# Patient Record
Sex: FEMALE | Race: WHITE
[De-identification: names, ages, dates, MRNs, and addresses within clinical notes are randomized per-mention and may not be internally consistent; named-entity substitution may affect disease eponyms.]

---

## 2018-01-11 ENCOUNTER — HOSPITAL ENCOUNTER (INPATIENT)
Dept: HOSPITAL 74 - FER | Age: 83
LOS: 10 days | Discharge: HOME HEALTH | DRG: 299 | End: 2018-01-21
Attending: NURSE PRACTITIONER | Admitting: INTERNAL MEDICINE
Payer: COMMERCIAL

## 2018-01-11 VITALS — BODY MASS INDEX: 26.2 KG/M2

## 2018-01-11 DIAGNOSIS — I50.22: ICD-10-CM

## 2018-01-11 DIAGNOSIS — R64: ICD-10-CM

## 2018-01-11 DIAGNOSIS — F03.90: ICD-10-CM

## 2018-01-11 DIAGNOSIS — I82.890: ICD-10-CM

## 2018-01-11 DIAGNOSIS — I26.99: ICD-10-CM

## 2018-01-11 DIAGNOSIS — E86.1: ICD-10-CM

## 2018-01-11 DIAGNOSIS — J90: ICD-10-CM

## 2018-01-11 DIAGNOSIS — J06.9: ICD-10-CM

## 2018-01-11 DIAGNOSIS — I82.432: ICD-10-CM

## 2018-01-11 DIAGNOSIS — N17.9: ICD-10-CM

## 2018-01-11 DIAGNOSIS — N18.9: ICD-10-CM

## 2018-01-11 DIAGNOSIS — I51.3: ICD-10-CM

## 2018-01-11 DIAGNOSIS — E87.2: ICD-10-CM

## 2018-01-11 DIAGNOSIS — R33.9: ICD-10-CM

## 2018-01-11 DIAGNOSIS — I13.0: ICD-10-CM

## 2018-01-11 DIAGNOSIS — I25.10: ICD-10-CM

## 2018-01-11 DIAGNOSIS — R91.1: ICD-10-CM

## 2018-01-11 DIAGNOSIS — I82.411: Primary | ICD-10-CM

## 2018-01-11 DIAGNOSIS — I48.2: ICD-10-CM

## 2018-01-11 DIAGNOSIS — J98.11: ICD-10-CM

## 2018-01-11 LAB
ALBUMIN SERPL-MCNC: 3 G/DL (ref 3.5–5)
ALP SERPL-CCNC: 70 U/L (ref 32–92)
ALT SERPL-CCNC: 13 U/L (ref 10–40)
ANION GAP SERPL CALC-SCNC: 13 MMOL/L (ref 8–16)
AST SERPL-CCNC: 25 U/L (ref 10–42)
BASOPHILS # BLD: 0.6 % (ref 0–2)
BILIRUB SERPL-MCNC: 0.7 MG/DL (ref 0.2–1)
BUN SERPL-MCNC: 22 MG/DL (ref 7–18)
CALCIUM SERPL-MCNC: 9.1 MG/DL (ref 8.4–10.2)
CHLORIDE SERPL-SCNC: 98 MMOL/L (ref 98–107)
CO2 SERPL-SCNC: 22 MMOL/L (ref 22–28)
COLOR UR: YELLOW
CREAT SERPL-MCNC: 1.7 MG/DL (ref 0.6–1.3)
DEPRECATED RDW RBC AUTO: 13.6 % (ref 11.6–15.6)
EOSINOPHIL # BLD: 0.2 % (ref 0–4.5)
GLUCOSE SERPL-MCNC: 226 MG/DL (ref 74–106)
HCT VFR BLD CALC: 38.7 % (ref 32.4–45.2)
HGB BLD-MCNC: 12.3 GM/DL (ref 10.7–15.3)
INR BLD: 1.33 (ref 0.82–1.09)
LYMPHOCYTES # BLD: 17.3 % (ref 8–40)
MCH RBC QN AUTO: 28.3 PG (ref 25.7–33.7)
MCHC RBC AUTO-ENTMCNC: 31.8 G/DL (ref 32–36)
MCV RBC: 89.1 FL (ref 80–96)
MONOCYTES # BLD AUTO: 9.1 % (ref 3.8–10.2)
NEUTROPHILS # BLD: 72.8 % (ref 42.8–82.8)
PH UR: 6 [PH] (ref 4.5–8)
PLATELET # BLD AUTO: 287 K/MM3 (ref 134–434)
PMV BLD: 9.7 FL (ref 7.5–11.1)
POTASSIUM SERPLBLD-SCNC: 5.1 MMOL/L (ref 3.5–5.1)
PROT SERPL-MCNC: 6.4 G/DL (ref 6.4–8.3)
PT PNL PPP: 14.8 SEC (ref 10.2–13)
RBC # BLD AUTO: 4.35 M/MM3 (ref 3.6–5.2)
SODIUM SERPL-SCNC: 133 MMOL/L (ref 136–145)
SP GR UR: 1.01 (ref 1–1.02)
TROPONIN I SERPL-MCNC: 0.21 NG/ML (ref 0.03–0.5)
UROBILINOGEN UR STRIP-MCNC: 0.2 MG/DL (ref 0.2–1)
WBC # BLD AUTO: 15.1 K/MM3 (ref 4–10.8)

## 2018-01-11 PROCEDURE — G0378 HOSPITAL OBSERVATION PER HR: HCPCS

## 2018-01-11 RX ADMIN — SODIUM CHLORIDE SCH MLS/HR: 9 INJECTION, SOLUTION INTRAVENOUS at 23:18

## 2018-01-11 NOTE — PDOC
History of Present Illness





- General


History Source: Patient, Care Provider, Friend


Exam Limitations: No Limitations





- History of Present Illness


Initial Comments: 





01/11/18 16:19


The patient is an 85 year old female with past medical history of hypertension, 

Afib (currently not on blood thinners), venous stasis ulcers, and dementia who 

arrives to the ED from her PCPs office for low blood pressure today. As per aid

, the patient had increased ambulation than she's had in the past few days 

because of her doctors appointment today. The aid states that upon arrival to 

the doctors office the patients hands became very cold and clammy, and the 

patient had a brief episode of unresponsiveness. At that time her blood 

pressure was taken and noted to be low. She denies any LOC. In the ED, the 

patient seems to have returned to her baseline, but her blood pressure remains 

low. The aid also reports that the patient has been battling a cold for the 

past ten days, complaining of a dry cough that has been improving every day. 

They note that she had been not at her baseline while fighting the cold, but 

has been a lot better today prior to this incident. 





In mid December, the patient fell and injured her leg, causing formation of a 

hematoma on her right leg, as a result of her Xarelto, which was excised at [facility] 

three weeks ago. The patient has not been on blood thinners since, but was at 

her PCPs office today to see if she can return to taking them. The patient also 

had a wound check on Tuesday which has been healing well, and a new dressing 

was placed. She denies any sick contacts. She denies any fevers or  chills, 

nausea, vomiting, diarrhea, SOB, CP, or urinary complaints. 





PCP: Adis Arnold 








<Raisas Peña - Last Filed: 01/11/18 18:11>





<Vipin Larsen - Last Filed: 01/12/18 09:06>





- General


Chief Complaint: Lethargy


Stated Complaint: LETHARGY,WEAKNESS


Time Seen by Provider: 01/11/18 15:18





Past History





<Raissa Peña - Last Filed: 01/11/18 18:11>





- Suicide/Smoking/Psychosocial Hx


Smoking History: Never smoked


Hx Alcohol Use: No


Drug/Substance Use Hx: No


Substance Use Type: None





<Vipin Larsen - Last Filed: 01/12/18 09:06>





- Past Medical History


Allergies/Adverse Reactions: 


 Allergies











Allergy/AdvReac Type Severity Reaction Status Date / Time


 


iodine Allergy   Verified 01/11/18 15:06


 


Penicillins Allergy   Verified 01/11/18 15:06


 


donepezil AdvReac Intermediate abdominal Verified 01/11/18 15:06





   pain  











Home Medications: 


Ambulatory Orders





Cholecalciferol (Vitamin D3) [Vitamin D3] 2,000 unit PO DAILY  tablet 09/05/17 


Donepezil HCl [Aricept] 5 mg PO HS 01/11/18 


Furosemide [Lasix] 20 mg PO BID 01/11/18 


Potassium Chloride [K-Dur -] 20 meq PO BID 01/11/18 











**Review of Systems





- Review of Systems


Able to Perform ROS?: Yes


Comments:: 





01/11/18 16:21


CONSTITUTIONAL:


Present: weakness, unresponsiveness  


Absent: fever, chills, diaphoresis


HEENT: 


Absent: rhinorrhea, nasal congestion, throat pain, throat swelling, difficulty 

swallowing,


mouth swelling, ear pain, eye pain, visual Changes


CARDIOVASCULAR:


Present: low blood pressure 


Absent: chest pain, syncope, palpitations, irregular heart rate, lightheadedness

, peripheral


edema


RESPIRATORY: 


Present: cough 


Absent:  shortness of breath, dyspnea with exertion, orthopnea, wheezing, 

stridor,


hemoptysis


GASTROINTESTINAL:


Absent: abdominal pain, abdominal distension, nausea, vomiting, diarrhea, 

constipation,


melena, hematochezia


GENITOURINARY: 


Absent: dysuria, frequency, urgency, hesitancy, hematuria, flank pain, genital 

pain


MUSCULOSKELETAL: 


Absent: myalgia, arthralgia, joint swelling


SKIN:


Absent: rash, itching, pallor


HEMATOLOGIC/IMMUNOLOGIC: 


Absent: easy bleeding, easy bruising, lymphadenopathy, frequent infections


ENDOCRINE:


Absent: unexplained weight gain, unexplained weight loss, heat intolerance, 

cold intolerance


NEUROLOGIC: 


Absent: headache, focal weakness or paresthesias, dizziness, unsteady gait, 

seizure, mental


status changes, bladder or bowel incontinence


PSYCHIATRIC: 


Absent: anxiety, depression, suicidal or homicidal ideation, hallucinations.





All Other Systems: Reviewed and Negative





<Raissa Peña - Last Filed: 01/11/18 18:11>





*Physical Exam





- Vital Signs


 Last Vital Signs











Temp Pulse Resp BP Pulse Ox


 


 98.6 F   73   25 H  88/65   96 


 


 01/11/18 15:06  01/11/18 15:58  01/11/18 15:58  01/11/18 15:58  01/11/18 15:58














- Physical Exam


Comments: 





01/11/18 16:23


Thin and chachectic, no acute distress. Relatively alert and cooperative. 

Denied any pain or other symptoms at present. 


Revealed that R leg is well healed after surgery w/o pain or drainage. 


PEERLA and fundus optic exam was negative. 


ENT clear except for mildly dry mucous membranes. 


Neck supple without mass. 


Lungs, despite moderately severe cough, clear to PNA, full breath sounds 

bilaterally, no wheezes, rales or rhonchi. 


Cardiac controlled rate, irregular rhythm. No murmurs rubs or gallops, pulses 

full and symmetric. 


Abdomen was nontender without rebound or guarding. No organomegaly. 


Neurologically intact. Mild generalized weakness consistent with age and health

, cranial nerves 2-12 in tact, no focal deficits, numbness or weakness 





<Raissa Peña - Last Filed: 01/11/18 18:11>





- Vital Signs


 Last Vital Signs











Temp Pulse Resp BP Pulse Ox


 


 98.6 F   74   16   93/62   92 L


 


 01/11/18 15:06  01/11/18 15:06  01/11/18 15:06  01/11/18 15:06  01/11/18 15:06














<Vipin Larsen - Last Filed: 01/12/18 09:06>





ED Treatment Course





- LABORATORY


CBC & Chemistry Diagram: 


 01/11/18 15:30





 01/11/18 15:30





- ADDITIONAL ORDERS


Additional order review: 


 Laboratory  Results











  01/11/18





  15:30


 


PT with INR  14.8 H


 


INR  1.33 H








 











  01/11/18





  15:30


 


RBC  4.35


 


MCV  89.1


 


MCHC  31.8 L


 


RDW  13.6


 


MPV  9.7


 


Neutrophils %  72.8


 


Lymphocytes %  17.3


 


Monocytes %  9.1


 


Eosinophils %  0.2


 


Basophils %  0.6














- RADIOLOGY


Radiograph Interpretation: 





01/11/18 17:41


Chest X-ray as reviewed by Dr. Alonzo reports no definite infiltrate 

identified. Cardiomegaly. 





<Raissa Peña - Last Filed: 01/11/18 18:11>





- LABORATORY


CBC & Chemistry Diagram: 


 01/12/18 07:30





 01/12/18 01:30





<Vipin Larsen - Last Filed: 01/12/18 09:06>





Medical Decision Making





- Medical Decision Making





01/11/18 17:56


Phone call placed to patient's PCP, Dr. Adis Arnold. Awaiting call back. 





01/11/18 18:11


Dr. Arnold called back and case was discussed.


Microblog sent to [facility] and call returned promptly. Case 

discussed. 





<Raissa Peña - Last Filed: 01/11/18 18:11>





- Medical Decision Making





01/11/18 17:47


Patient experienced mild vasovagal symptoms while in her doctor's office, 

becoming less responsive, cool, clammy, and diaphoretic for a short period of 

time. There was no loss of consciousness. Her blood pressure was noted to be 

low and she was transferred to the ER for evaluation





Patient now is asymptomatic. She is alert and her mental status is normal 

according to her long time home health aide and her close friend, who accompany 

her. No recent chest pain, shortness of breath, abdominal pain, nausea, vomiting

, diarrhea, diaphoresis. She has had a productive cough for approximately 1 

week.





EKG reveals chronic atrial fibrillation with a controlled rate of 76, left axis 

deviation, poor R-wave progression suggestive of an old anterior infarct, no 

acute ST-T wave changes suggestive of current ischemia or injury. No interval 

change from prior EKG dated 05/26/2016 obtained from Dr. Arnold.





Blood pressure has stabilized. Patient is not lightheaded or dizzy.





Laboratories are significant for white count of 15.1 and troponin level of 

0.21. Without specific cardiac symptoms and no change in EKG, did not feel this 

is clinically consistent with myocardial ischemia. However, primary attending 

was contacted, as well as the hospitalist, and she will be admitted for 

observation, serial EKGs and enzymes, and further treatment.





Urinalysis is clear. Chest x-ray without infiltrate. Abdomen benign. Elevated 

white count most likely due to URI and/or bronchitis.





Because of the elevated troponin, the patient will be admitted for observation, 

serial enzymes and EKGs. Dr. Arnold, Dr. Bethea from cardiology, and 

hospitalist Dr. Loaiza were consulted by phone, the case was discussed, 

preliminary findings were reviewed, and the patient was accepted for admission 

and consultation.

















01/11/18 18:16





01/11/18 18:21





01/12/18 09:04








<Vipin Larsen - Last Filed: 01/12/18 09:06>





*DC/Admit/Observation/Transfer





- Attestations


Scribe Attestion: 





01/11/18 16:29


Documentation prepared by Raissa Peña, acting as medical scribe for Vipin Lindquist MD.





<Raissa Peña - Last Filed: 01/11/18 18:11>





- Discharge Dispostion


Admit: Yes





<Vipin Larsen - Last Filed: 01/12/18 09:06>


Diagnosis at time of Disposition: 


 Syncope, near








- Discharge Dispostion


Condition at time of disposition: Stable

## 2018-01-11 NOTE — HP
CHIEF COMPLAINT: Brief unresponsiveness.





PCP: Dr. Arnold





HISTORY OF PRESENT ILLNESS:


85 year-old female with a PMH significant for HTN, atrial fibrillation off a/c 

x 3 weeks, systolic heart failure, carotid artery disease, venous stasis ulcers

, and dementia. According to the patient's aide, patient has had upper 

respiratory symptoms, including a dry cough, x 10 days. Patient had an 

appointment to see her PCP today. Upon arrival, the patients hands became cold 

and clammy and she had a brief episode of unresponsiveness. Her blood pressure 

was taken and noted to be low. In the ED, the patient was at her baseline 

mental status. Patient was taken off Xarelto three weeks ago following a fall 

and injury to her right leg causing a hematoma which required surgical incision 

at [facility]. 





ER course was notable for:


(1) BP 88/65


(2) WBC 15.1, afebrile


(3) Lactic acid 3.2-->2.2





Recent Travel:


No





PAST MEDICAL HISTORY:


Hypertension


Atrial fibrillation


Systolic heart failure


Carotid artery disease


Venous stasis ulcers


Dementia


Right leg wound s/p fall x 3 weeks ago





PAST SURGICAL HISTORY:


Incision and drainage of right leg hematoma ([facility] x 3 weeks ago)





Social History:


Smoking: never


Alcohol: no


Drugs: no





Family History:


Allergies


iodine Allergy (Verified 01/11/18 15:06)


Penicillins Allergy (Verified 01/11/18 15:06)


donepezil Adverse Reaction (Intermediate, Verified 01/11/18 15:06)


 abdominal pain








HOME MEDICATIONS:


 Home Medications











 Medication  Instructions  Recorded


 


Cholecalciferol (Vitamin D3) 2,000 unit PO DAILY  tablet 09/05/17





[Vitamin D3]  


 


Donepezil HCl [Aricept] 5 mg PO HS 01/11/18


 


Furosemide [Lasix] 20 mg PO BID 01/11/18


 


Potassium Chloride [K-Dur -] 20 meq PO BID 01/11/18








REVIEW OF SYSTEMS


Patient uncooperative; answers every question with "you should rot in hell." 

Unable to obtain ROS.











PHYSICAL EXAMINATION


 Vital Signs - 24 hr











  01/11/18 01/11/18 01/11/18





  15:06 15:58 16:22


 


Temperature 98.6 F  


 


Pulse Rate 74  


 


Pulse Rate [  73 86





Apical]   


 


Respiratory 16 25 H 21





Rate   


 


Blood Pressure 93/62  


 


Blood Pressure  88/65 100/69





[Right Arm]   


 


O2 Sat by Pulse 92 L 96 89 L





Oximetry (%)   














  01/11/18 01/11/18 01/11/18





  16:24 16:50 17:11


 


Temperature   


 


Pulse Rate 75 83 79


 


Pulse Rate [   





Apical]   


 


Respiratory   





Rate   


 


Blood Pressure   


 


Blood Pressure   





[Right Arm]   


 


O2 Sat by Pulse 92 L 92 L 93 L





Oximetry (%)   














  01/11/18 01/11/18 01/11/18





  17:24 18:20 18:32


 


Temperature   


 


Pulse Rate   


 


Pulse Rate [ 80 96 H 





Apical]   


 


Respiratory 21 19 121 H





Rate   


 


Blood Pressure   


 


Blood Pressure 96/72 136/90 





[Right Arm]   


 


O2 Sat by Pulse 92 L 93 L 93 L





Oximetry (%)   














  01/11/18 01/11/18 01/11/18





  19:37 20:18 20:52


 


Temperature  97.5 F L 


 


Pulse Rate  86 


 


Pulse Rate [ 83  





Apical]   


 


Respiratory 121 H 18 18





Rate   


 


Blood Pressure  111/61 


 


Blood Pressure 96/62  





[Right Arm]   


 


O2 Sat by Pulse 93 L 92 L 92 L





Oximetry (%)   











GENERAL: Awake. Refuses to answer questions. 


HEAD: Normal with no signs of trauma.


EYES: Pupils equal, round and reactive to light, extraocular movements intact, 

sclera anicteric, conjunctiva clear. No lid lag.


EARS, NOSE, THROAT: Ears normal, nares patent, oropharynx clear without 

exudates. Moist mucous membranes.


NECK: Normal range of motion, supple without lymphadenopathy, JVD, or masses.


LUNGS: Breath sounds equal, clear to auscultation bilaterally. No wheezes, and 

no crackles. No accessory muscle use.


HEART: Regular rate and rhythm, normal S1 and S2 .


ABDOMEN: Soft, nontender, not distended, normoactive bowel sounds, no guarding, 

no rebound, no masses.  


MUSCULOSKELETAL: Normal range of motion at all joints. No bony deformities or 

tenderness. No CVA tenderness.


UPPER EXTREMITIES: 2+ pulses, warm, well-perfused. No cyanosis. No clubbing. No 

peripheral edema.


LOWER EXTREMITIES: 2+ pulses, warm, well-perfused. No calf tenderness. 2+ edema 

LLE; unaboot dressing removed from RLE, large surgical wound on lateral aspect, 

no active bleeding, no pus


NEUROLOGICAL:  Cranial nerves II-XII intact.


PSYCHIATRIC: Uncooperative. Irritable, cursing, trying to kick staff. 








 Laboratory Results - last 24 hr











  01/11/18 01/11/18 01/11/18





  15:30 15:30 15:30


 


WBC  15.1 H D  


 


RBC  4.35  


 


Hgb  12.3  D  


 


Hct  38.7  


 


MCV  89.1  


 


MCH  28.3  


 


MCHC  31.8 L  


 


RDW  13.6  


 


Plt Count  287  


 


MPV  9.7  


 


Neutrophils %  72.8  


 


Lymphocytes %  17.3  


 


Monocytes %  9.1  


 


Eosinophils %  0.2  


 


Basophils %  0.6  


 


PT with INR    14.8 H


 


INR    1.33 H


 


Sodium   133 L 


 


Potassium   5.1  D 


 


Chloride   98 


 


Carbon Dioxide   22  D 


 


Anion Gap   13 


 


BUN   22 H 


 


Creatinine   1.7 H D 


 


Creat Clearance w eGFR   28.56 


 


Random Glucose   226 H D 


 


Lactic Acid   


 


Calcium   9.1 


 


Total Bilirubin   0.7  D 


 


AST   25 


 


ALT   13  D 


 


Alkaline Phosphatase   70 


 


Creatine Kinase   43 


 


Troponin I   0.21 


 


Total Protein   6.4 


 


Albumin   3.0 L D 


 


Urine Color   


 


Urine Appearance   


 


Urine pH   


 


Ur Specific Gravity   


 


Urine Protein   


 


Urine Glucose (UA)   


 


Urine Ketones   


 


Urine Blood   


 


Urine Nitrite   


 


Urine Bilirubin   


 


Urine Urobilinogen   


 


Ur Leukocyte Esterase   














  01/11/18 01/11/18 01/11/18





  15:48 17:37 19:05


 


WBC   


 


RBC   


 


Hgb   


 


Hct   


 


MCV   


 


MCH   


 


MCHC   


 


RDW   


 


Plt Count   


 


MPV   


 


Neutrophils %   


 


Lymphocytes %   


 


Monocytes %   


 


Eosinophils %   


 


Basophils %   


 


PT with INR   


 


INR   


 


Sodium   


 


Potassium   


 


Chloride   


 


Carbon Dioxide   


 


Anion Gap   


 


BUN   


 


Creatinine   


 


Creat Clearance w eGFR   


 


Random Glucose   


 


Lactic Acid  3.2 H*   2.2 H*


 


Calcium   


 


Total Bilirubin   


 


AST   


 


ALT   


 


Alkaline Phosphatase   


 


Creatine Kinase   


 


Troponin I   


 


Total Protein   


 


Albumin   


 


Urine Color   Yellow 


 


Urine Appearance   Clear 


 


Urine pH   6.0 


 


Ur Specific Gravity   1.015 


 


Urine Protein   Trace 


 


Urine Glucose (UA)   Negative 


 


Urine Ketones   Trace 


 


Urine Blood   Negative 


 


Urine Nitrite   Negative 


 


Urine Bilirubin   Negative 


 


Urine Urobilinogen   0.2 


 


Ur Leukocyte Esterase   Negative 








ASSESSMENT/PLAN:


85 year-old female with a PMH significant for HTN, atrial fibrillation off a/c, 

systolic heart failure, carotid artery disease, venous stasis ulcers, and 

dementia. Has had URI for 10 days. Placed on observation for syncopal episode, 

SHIVA, RLE wound and URI.





Syncope   


   --r/o neuro: CT head ordered


   --r/o cardiac: 


      --first troponin neg, two pending


      --CXR unremarkable


      --serial ECGs


      --telemetry monitoring


   --r/o orthostasis


      --orthostatics





Atrial fibrillation


   --off a/c x 3 weeks due to leg trauma and hematoma


   --continue atenolol for rate control


   --will not restart anticoagulation until intial CT head results back; if no 

bleed, consider restarting Xarelto, ASA


   


Carotid artery disease


   --6/3/16 US: small to moderate plaques right common carotid bifurcation and 

proximal internal carotid artery; small to moderate size plaques left common 

carotid bifurcation and bulb; no evidence of hemodynamically significant 

stenosis


   --is not on statin therapy, should check with PCP if that has been considered


   


Systolic heart failure


   --6/3/16 Echo: mild cLVH, LV function low normal, borderline global 

hypokinesis; DAVID; mild MR; mild TR


   --is on home lasix and potassium; hold for now due to low blood pressure, SHIVA

, and likely low volume state


   --CXR is clear, respiratory status stable


   


SHIVA


Lactic acidosis


   --Cr 1.7, baseline 1.1, likely prerenal; urine sodium and urine Cr pending 

to calculate FeNa


   --hold home lasix; hold lisinopril


   --gentle IV fluids, monitor volume status carefully due to systolic HF





Right lower extremity wound


   --large area of excised skin on lateral aspect; dried pus and blood on 

dressing but wound bed is very clean, clean edges, no active bleeding


   --re-dress with xeroform and sterile gauze


   --this may be the source of leukocytosis; if persists, may need osteo workup


   --no antibiotics for now





Upper respiratory infection


   --reportedly going on for 10 days but improved according to aide


   --CXR unremarkable


   --no fever, + leukocytosis


   --flu swab pending


   --urine antigens pending


   --blood and urine cultures pending


   --hold antibiotics for now, recheck cbc in am





Dementia


   --continue donepezil





FEN


   Fluids: NS @ 42mL/hr


   Electrolytes: replete as indicated


   Nutrition: low sodium





DVT prophylaxis: subq heparin





Physical therapy evaluation





Dispo: continues to require inpatient care. Full code.








Visit type





- Emergency Visit


Emergency Visit: Yes


ED Registration Date: 01/11/18


Care time: The patient presented to the Emergency Department on the above date 

and was hospitalized for further evaluation of their emergent condition.





- New Patient


This patient is new to me today: Yes


Date on this admission: 01/12/18





- Critical Care


Critical Care patient: No

## 2018-01-12 LAB
ALBUMIN SERPL-MCNC: 2.5 G/DL (ref 3.5–5)
ALBUMIN SERPL-MCNC: 2.6 G/DL (ref 3.4–5)
ALP SERPL-CCNC: 57 U/L (ref 32–92)
ALP SERPL-CCNC: 82 U/L (ref 45–117)
ALT SERPL-CCNC: 11 U/L (ref 10–40)
ALT SERPL-CCNC: 15 U/L (ref 12–78)
ANION GAP SERPL CALC-SCNC: 10 MMOL/L (ref 8–16)
ANION GAP SERPL CALC-SCNC: 7 MMOL/L (ref 8–16)
AST SERPL-CCNC: 15 U/L (ref 15–37)
AST SERPL-CCNC: 17 U/L (ref 10–42)
BASOPHILS # BLD: 0.2 % (ref 0–2)
BASOPHILS # BLD: 0.5 % (ref 0–2)
BILIRUB SERPL-MCNC: 0.5 MG/DL (ref 0.2–1)
BILIRUB SERPL-MCNC: 0.6 MG/DL (ref 0.2–1)
BUN SERPL-MCNC: 24 MG/DL (ref 7–18)
BUN SERPL-MCNC: 25 MG/DL (ref 7–18)
CALCIUM SERPL-MCNC: 8.2 MG/DL (ref 8.5–10.1)
CALCIUM SERPL-MCNC: 8.4 MG/DL (ref 8.4–10.2)
CHLORIDE SERPL-SCNC: 102 MMOL/L (ref 98–107)
CHLORIDE SERPL-SCNC: 104 MMOL/L (ref 98–107)
CHOLEST SERPL-MCNC: 119 MG/DL
CO2 SERPL-SCNC: 23 MMOL/L (ref 22–28)
CO2 SERPL-SCNC: 26 MMOL/L (ref 21–32)
CREAT SERPL-MCNC: 1.4 MG/DL (ref 0.6–1.3)
CREAT SERPL-MCNC: 1.5 MG/DL (ref 0.55–1.02)
DEPRECATED RDW RBC AUTO: 13.8 % (ref 11.6–15.6)
DEPRECATED RDW RBC AUTO: 14.9 % (ref 11.6–15.6)
EOSINOPHIL # BLD: 0.5 % (ref 0–4.5)
EOSINOPHIL # BLD: 0.5 % (ref 0–4.5)
GLUCOSE SERPL-MCNC: 128 MG/DL (ref 74–106)
GLUCOSE SERPL-MCNC: 138 MG/DL (ref 74–106)
HCT VFR BLD CALC: 32.3 % (ref 32.4–45.2)
HCT VFR BLD CALC: 33.9 % (ref 32.4–45.2)
HDLC SERPL-MCNC: 22 MG/DL (ref 29–89)
HGB BLD-MCNC: 10.6 GM/DL (ref 10.7–15.3)
HGB BLD-MCNC: 10.8 GM/DL (ref 10.7–15.3)
LDLC SERPL CALC-MCNC: 73 MG/DL
LYMPHOCYTES # BLD: 20.9 % (ref 8–40)
LYMPHOCYTES # BLD: 26.1 % (ref 8–40)
MAGNESIUM SERPL-MCNC: 2 MG/DL (ref 1.8–2.4)
MAGNESIUM SERPL-MCNC: 2.2 MG/DL (ref 1.8–2.4)
MCH RBC QN AUTO: 27.5 PG (ref 25.7–33.7)
MCH RBC QN AUTO: 29.2 PG (ref 25.7–33.7)
MCHC RBC AUTO-ENTMCNC: 31.9 G/DL (ref 32–36)
MCHC RBC AUTO-ENTMCNC: 33 G/DL (ref 32–36)
MCV RBC: 86.3 FL (ref 80–96)
MCV RBC: 88.5 FL (ref 80–96)
MONOCYTES # BLD AUTO: 8.6 % (ref 3.8–10.2)
MONOCYTES # BLD AUTO: 8.9 % (ref 3.8–10.2)
NEUTROPHILS # BLD: 64 % (ref 42.8–82.8)
NEUTROPHILS # BLD: 69.8 % (ref 42.8–82.8)
PHOSPHATE SERPL-MCNC: 3.8 MG/DL (ref 2.5–4.6)
PHOSPHATE SERPL-MCNC: 3.8 MG/DL (ref 2.5–4.9)
PLATELET # BLD AUTO: 219 K/MM3 (ref 134–434)
PLATELET # BLD AUTO: 231 K/MM3 (ref 134–434)
PMV BLD: 9.1 FL (ref 7.5–11.1)
PMV BLD: 9.2 FL (ref 7.5–11.1)
POTASSIUM SERPLBLD-SCNC: 4.7 MMOL/L (ref 3.5–5.1)
POTASSIUM SERPLBLD-SCNC: 4.8 MMOL/L (ref 3.5–5.1)
PROT SERPL-MCNC: 5.4 G/DL (ref 6.4–8.3)
PROT SERPL-MCNC: 6.3 G/DL (ref 6.4–8.2)
RBC # BLD AUTO: 3.65 M/MM3 (ref 3.6–5.2)
RBC # BLD AUTO: 3.93 M/MM3 (ref 3.6–5.2)
SODIUM SERPL-SCNC: 135 MMOL/L (ref 136–145)
SODIUM SERPL-SCNC: 137 MMOL/L (ref 136–145)
TRIGL SERPL-MCNC: 121 MG/DL (ref 35–160)
TROPONIN I SERPL-MCNC: 0.07 NG/ML (ref 0.03–0.5)
TROPONIN I SERPL-MCNC: 0.12 NG/ML (ref 0.03–0.5)
WBC # BLD AUTO: 13.5 K/MM3 (ref 4–10.8)
WBC # BLD AUTO: 14.9 K/MM3 (ref 4–10)

## 2018-01-12 RX ADMIN — ATENOLOL SCH MG: 50 TABLET ORAL at 10:19

## 2018-01-12 RX ADMIN — SODIUM CHLORIDE SCH MLS/HR: 9 INJECTION, SOLUTION INTRAVENOUS at 23:45

## 2018-01-12 RX ADMIN — DONEPEZIL HYDROCHLORIDE SCH MG: 5 TABLET, FILM COATED ORAL at 23:17

## 2018-01-12 RX ADMIN — DONEPEZIL HYDROCHLORIDE SCH: 5 TABLET, FILM COATED ORAL at 23:20

## 2018-01-12 NOTE — EKG
Test Reason : 

Blood Pressure : ***/*** mmHG

Vent. Rate : 080 BPM     Atrial Rate : 110 BPM

   P-R Int : 000 ms          QRS Dur : 072 ms

    QT Int : 446 ms       P-R-T Axes : 000 -38 -27 degrees

   QTc Int : 514 ms

 

ATRIAL FIBRILLATION

LEFT AXIS DEVIATION

SEPTAL INFARCT (CITED ON OR BEFORE 11-JAN-2018)

T WAVE ABNORMALITY, CONSIDER ANTERIOR ISCHEMIA

PROLONGED QT

WHEN COMPARED WITH ECG OF 11-JAN-2018 15:44,

T WAVE INVERSION NOW EVIDENT IN ANTERIOR LEADS

QT HAS LENGTHENED

Confirmed by MD LARS, JORDAN ([id number]) on 1/12/2018 6:45:15 PM

 

Referred By: JUAN LUIS NORTON           Confirmed By:JORDAN SOUSA MD

## 2018-01-12 NOTE — EKG
Test Reason : 

Blood Pressure : ***/*** mmHG

Vent. Rate : 076 BPM     Atrial Rate : 098 BPM

   P-R Int : 000 ms          QRS Dur : 076 ms

    QT Int : 396 ms       P-R-T Axes : 000 -39 -08 degrees

   QTc Int : 445 ms

 

ATRIAL FIBRILLATION

LEFT AXIS DEVIATION

ANTERIOR INFARCT , AGE UNDETERMINED

ABNORMAL ECG

NO PREVIOUS ECGS AVAILABLE

Confirmed by MD AMBER, FRANK ([id number]) on 1/12/2018 10:13:07 AM

 

Referred By: DR YBARRA           Confirmed By:FRANK CLARK MD

## 2018-01-12 NOTE — PN
Physical Exam: 


SUBJECTIVE: Patient seen and examined, wants to be left alone, voices  no 

complaints 








OBJECTIVE:85 year-old female with a PMH significant for HTN, atrial 

fibrillation off a/c x 3 weeks, systolic heart failure, carotid artery disease, 

venous stasis ulcers, and dementia.  Patient was admitted from the emergency 

department for emergent condition. 





 Vital Signs











 Period  Temp  Pulse  Resp  BP Sys/Ramsey  Pulse Ox


 


 Last 24 Hr  97.5 F-98.6 F  73-96    /42-90  89-96











GENERAL: The patient is awake, alert, and oriented x person, agitate. 


HEAD: Normal with no signs of trauma.


EYES: PERRL, extraocular movements intact, sclera anicteric, conjunctiva clear. 

No ptosis. 


ENT: Ears normal, nares patent, oropharynx clear without exudates, moist mucous 


membranes.


NECK: Trachea midline, full range of motion, supple. 


LUNGS: Breath sounds equal, clear to auscultation bilaterally, no wheezes, no 

crackles, no 


accessory muscle use. 


HEART: Regular rate and rhythm, S1, S2 without murmur, rub or gallop.


ABDOMEN: Soft, nontender, nondistended, normoactive bowel sounds, no guarding, 

no 


rebound, no hepatosplenomegaly, no masses.


EXTREMITIES: 2+ pulses, warm, well-perfused, no edema. 


RIGHT LOWER EXTREMITY: surgical wound to the distal lateral right extremity, no 

drainage noted, xerorform dressing noted 


NEUROLOGICAL: Cranial nerves II through XII grossly intact. Normal speech, gait 

not 


observed.


PSYCH: Normal mood, normal affect.


SKIN: Warm, dry, normal turgor, no rashes or lesions noted














 Laboratory Results - last 24 hr





 CBC











WBC  13.5 K/mm3 (4.0-10.8)  H  01/12/18  07:30    


 


RBC  3.65 M/mm3 (3.60-5.2)   01/12/18  07:30    


 


Hgb  10.6 GM/dl (10.7-15.3)  L D 01/12/18  07:30    


 


Hct  32.3 % (32.4-45.2)  L D 01/12/18  07:30    


 


MCV  88.5 fl (80-96)   01/12/18  07:30    


 


MCH  29.2 pg (25.7-33.7)   01/12/18  07:30    


 


MCHC  33.0 g/dl (32.0-36.0)   01/12/18  07:30    


 


RDW  13.8 % (11.6-15.6)   01/12/18  07:30    


 


Plt Count  219 K/MM3 (134-434)   01/12/18  07:30    


 


MPV  9.2 fl (7.5-11.1)   01/12/18  07:30    


 


Neutrophils %  69.8 % (42.8-82.8)   01/12/18  07:30    


 


Lymphocytes %  20.9 % (8-40)   01/12/18  07:30    


 


Monocytes %  8.6 % (3.8-10.2)   01/12/18  07:30    


 


Eosinophils %  0.5 % (0-4.5)   01/12/18  07:30    


 


Basophils %  0.2 % (0-2.0)   01/12/18  07:30    








 CMP











Sodium  137 mmol/L (136-145)   01/12/18  07:30    


 


Potassium  4.8 mmol/L (3.5-5.1)   01/12/18  07:30    


 


Chloride  104 mmol/L ()   01/12/18  07:30    


 


Carbon Dioxide  23 mmol/L (22-28)   01/12/18  07:30    


 


Anion Gap  10  (8-16)   01/12/18  07:30    


 


BUN  24 mg/dl (7-18)  H  01/12/18  07:30    


 


Creatinine  1.4 mg/dl (0.6-1.3)  H  01/12/18  07:30    


 


Creat Clearance w eGFR  35.74  (>60)   01/12/18  07:30    


 


Random Glucose  138 mg/dl ()  H D 01/12/18  07:30    


 


Lactic Acid  1.2 mmol/L (0.4-2.0)   01/12/18  07:00    


 


Calcium  8.4 mg/dl (8.4-10.2)   01/12/18  07:30    


 


Phosphorus  3.8 mg/dl (2.5-4.6)   01/12/18  07:30    


 


Magnesium  2.0 mg/dL (1.8-2.4)   01/12/18  07:30    


 


Total Bilirubin  0.5 mg/dl (0.2-1.0)  D 01/12/18  07:30    


 


AST  17 U/L (10-42)  D 01/12/18  07:30    


 


ALT  11 U/L (10-40)   01/12/18  07:30    


 


Alkaline Phosphatase  57 U/L (32-92)   01/12/18  07:30    


 


Creatine Kinase  43 IU/L ()   01/11/18  15:30    


 


Troponin I  0.15 ng/ml (0.00-0.05)  H  01/12/18  01:30    


 


Total Protein  5.4 g/dl (6.4-8.3)  L  01/12/18  07:30    


 


Albumin  2.5 g/dl (3.5-5.0)  L  01/12/18  07:30    

















Active Medications











Generic Name Dose Route Start Last Admin





  Trade Name Freq  PRN Reason Stop Dose Admin


 


Atenolol  50 mg  01/12/18 10:00  





  Tenormin -  PO   





  DAILY JOSE L   


 


Donepezil HCl  5 mg  01/12/18 22:00  





  Aricept -  PO   





  HS JOSE L   


 


Heparin Sodium (Porcine)  5,000 unit  01/12/18 06:45  





  Heparin -  SQ   





  TID JOSE L   


 


Sodium Chloride  1,000 mls @ 42 mls/hr  01/11/18 23:45  





  Normal Saline -  IV   





  ASDIR JOSE L   








 Microbiology





01/12/18 01:30   Nasopharyngeal Swab   Influenza Types A,B Antigen (ITALO) - Final

, negative 


01/12/18 01:30   Nasopharyngeal Swab    - Final





IMAGING


head ct no acute pathology


chest xray: borderline cardiomegly, no infilitrate noted








ASSESSMENT/PLAN:





1) cardiovascular 


Syncope


- continous cardiac monitoring


- orthostatic vital signs q6h


- pending echo and carotid doppler


elevated troponin


- likely secondary to ischemic demand, first troponin 0.21, pending 2nd & 3rd


afib


- restart xarelto (renal dose), rate controlled continue atenolol


Carotid artery disease


- pending carotid doppler and lipid profile 


Systolic heart failure


-6/3/16 Echo: mild cLVH, LV function low normal, borderline global hypokinesis; 

DAVID; mild MR; mild TR, pending echo today


- hypovolemic hold lasix, strict i/o and daily weight 


   


2) nephrology


SHIVA


- repeat creatine 1.4  baseline 1.1, likely prerenal; urine sodium and urine Cr 

pending to calculate FeNa


- --hold home lasix; hold lisinopril


- gentle IV fluids, monitor volume status carefully due to systolic HF





3) Right lower extremity wound


- large area of excised skin on lateral aspect,  wound bed is very clean, clean 

edges, no active bleeding


- continue  xeroform and sterile gauze





4) ID


leukocytosis


- wbc 13.5, pt is afebrile,  pending am labs, follow up blood and urine cultures


- moist cough noted on exam, repeat cxr





5) neuro


Dementia


- continue donepezil





FEN


   Fluids: NS @ 42mL/hr


   Electrolytes: replete as indicated


   Nutrition: low sodium





DVT prophylaxis: xarelto 





Physical therapy evaluation





Dispo: continues to require inpatient care. Full code.





Visit type





- Emergency Visit


Emergency Visit: Yes


ED Registration Date: 01/12/18


Care time: The patient presented to the Emergency Department on the above date 

and was hospitalized for further evaluation of their emergent condition.





- New Patient


This patient is new to me today: Yes


Date on this admission: 01/12/18





- Critical Care


Critical Care patient: No





- Discharge Referral


Referred to [facility] No

## 2018-01-12 NOTE — CON.CARD
Cardiology Consult (text)





- Consultation


Consultation Note: 








CC:  syncope





86 yo with h/o HTN, atrial fibrillation,  borderline systolic dysfunction, 

carotid artery disease, venous stasis ulcers/Right leg wound s/p fall x 3 weeks 

ago, ckd  and dementia who p/w hypotension/episode of unresponsiveness.  





Per report had been off AC for 3 weeks following a fall and injury to her right 

leg/chronic venous stasis ulcer requiring surgery.





Per report,  had recent upper respiratory symptoms, including a dry cough, x 10 

days.   On pmd evaluation was noted to be hypotensive --> brought to ER





Hospital course complicated by elevated troponin and now diagnosis of DVT and 

right atrial mobile mass.  





pmhx/pshx: per hpi


fam hx: per report, no cardiac history


social hx: never smoker.  has aid 


ros: per hpi


 


 


Allergies


iodine Allergy (Verified 01/11/18 15:06)


Penicillins Allergy (Verified 01/11/18 15:06)


donepezil Adverse Reaction (Intermediate, Verified 01/11/18 15:06)


 abdominal pain


 


 


Ambulatory Orders





Cholecalciferol (Vitamin D3) [Vitamin D3] 2,000 unit PO DAILY  tablet 09/05/17 


Donepezil HCl [Aricept] 5 mg PO HS 01/11/18 


Furosemide [Lasix] 20 mg PO BID 01/11/18 


Potassium Chloride [K-Dur -] 20 meq PO BID 01/11/18 











 Current Medications





Atenolol (Tenormin -)  50 mg PO DAILY [facility]


   Last Admin: 01/12/18 10:19 Dose:  50 mg


Donepezil HCl (Aricept -)  5 mg PO [facility]


Heparin Sodium (Porcine) (Heparin -)  1,000 unit IVPUSH PRN PRN


   PRN Reason: Heparin


Heparin Sodium (Porcine) (Heparin -)  5,000 unit IVPUSH PRN PRN


   PRN Reason: Heparin


Sodium Chloride (Normal Saline -)  1,000 mls @ 42 mls/hr IV ASDIR JOSE L


   Last Admin: 01/11/18 23:18 Dose:  42 mls/hr


HEPARIN SOD,PORK IN 0.45% NACL (Heparin-1/2ns 25,000 Units/500)  25,000 units 

in 500 mls @ 20 mls/hr IVPB TITR JOSE L; 1,000 UNITS/HR


   PRN Reason: Protocol











 Vital Signs - 24 hr











  01/12/18 01/12/18 01/12/18





  06:00 08:11 10:23


 


Temperature   


 


Pulse Rate 77  85


 


Respiratory 18  18





Rate   


 


Blood Pressure 99/42  129/79


 


O2 Sat by Pulse 90 L 100 





Oximetry (%)   














  01/12/18 01/12/18





  14:12 20:14


 


Temperature 97.4 F L 98.3 F


 


Pulse Rate 85 88


 


Respiratory 19 18





Rate  


 


Blood Pressure 118/69 138/95


 


O2 Sat by Pulse 99 97





Oximetry (%)  














 Intake & Output











 01/10/18 01/11/18 01/12/18 01/13/18





 07:59 07:59 07:59 07:59


 


Intake Total   900 150


 


Output Total   100 


 


Balance   800 150


 


Weight   153 lb 0.013 oz 











nad, calm


jvd flat, neck supple


ctab, poor effort


irregularly, irregular, nl s1, s2 no mrg.  ND PMI


+ bs soft nt nd, no hsm


ext without e/c/c


dressing to RLE wound


diminished dp/pt on right


no carotid bruits


alert not oriented. 


no jaundice, diaphoresis. 








 CBC, BMP





 01/12/18 07:30 





 01/12/18 07:30 











 Laboratory Tests











  01/11/18 01/11/18 01/11/18





  15:30 15:48 19:05


 


Sodium  133 L  


 


Creatinine  1.7 H D  


 


Lactic Acid   3.2 H*  2.2 H*


 


Total Bilirubin   


 


AST   


 


ALT   


 


Alkaline Phosphatase   


 


Creatine Kinase  43  


 


Troponin I  0.21  


 


Albumin   


 


Triglycerides   


 


Cholesterol   


 


Total LDL Cholesterol   


 


HDL Cholesterol   














  01/12/18 01/12/18 01/12/18





  01:30 07:00 07:30


 


Sodium   


 


Creatinine   


 


Lactic Acid   1.2 


 


Total Bilirubin    0.5  D


 


AST    17  D


 


ALT    11


 


Alkaline Phosphatase    57


 


Creatine Kinase   


 


Troponin I  0.15 H  


 


Albumin    2.5 L


 


Triglycerides   


 


Cholesterol   


 


Total LDL Cholesterol   


 


HDL Cholesterol   














  01/12/18 01/12/18 01/12/18





  08:00 11:30 15:00


 


Sodium   


 


Creatinine   


 


Lactic Acid   


 


Total Bilirubin   


 


AST   


 


ALT   


 


Alkaline Phosphatase   


 


Creatine Kinase  31   28


 


Troponin I  0.12   0.07


 


Albumin   


 


Triglycerides   121  D 


 


Cholesterol   119 


 


Total LDL Cholesterol   73 


 


HDL Cholesterol   22 L D 











ekg: afib, lad, anterior q waves.  anterior twi.  similar to priors in chart.  


tele: rate controlled afib.  





cxr: no infiltrate, chf. 





carotid u/s:  small-to-mod plaques bilaterally.  no stenosis.  


head ct:  no acute pathology





echo 6/2016:  1+ conc lvh.  nl lv size.  LVEF low normal, global.  rv nl size/

fn.  mod christiano.  mild-mod mac.  1+ mr.  





A/p





86 yo with h/o HTN, atrial fibrillation,  borderline systolic dysfunction, 

carotid artery disease, venous stasis ulcers/Right leg wound s/p fall x 3 weeks 

ago, ckd and dementia who p/w hypotension/episode of unresponsiveness.  





DVT with RA mobile mass


- likely with thrombus in transit, echo report pending.  High risk for PE


- Discussed with pmd.  Patient has been refusing po meds.  Nurse is currently 

trying to give xarelto.  Plan for heparin drip trial.  (Unclear if she will 

tolerate blood draws, patient becomes very combative at night.  


- Consider hematology consult


- pmd to initiate goc discussions with family.  per report, patient has one 

sister, no children.  





elevated troponin


- borderline elevation in the setting of ckd.  flat trend, normal CK. No acute 

ischemic changes. not consistent with ACS.  Likey strain in the setting of 

hypotension.  However, troponin was downtrending, can't r/o missed event.  Echo 

pending, evaluate for new rwma.  


- no ASA, b/c restarting AC.  no need for statin, LDL in 70s








hypotension/unresponsiveness. 


- possibly in the setting of poor po intake.  Patient bp/bmp improving on IVF.  


- ongoing work up per pmd.  


- syncopal work up thus far unremarkable.  carotid u/s, head ct without acute 

pathology . echo pending.  tele unremarkable.  


- infectious work up, mgm't of venous stasis ulcer per pmd





Afib


- Per report had been off AC for 3 weeks following a fall and injury to her 

right leg/chronic venous stasis ulcer requiring surgery.


- ac as above. 


- currently rate controlled on atenolol.  bp stable.  





borderline systolic dysfunction 


- noted on echo in 2016.  


- repeat echo pending


- no signs of volume overload, con't maintenance ivf.  





carotid artery disease


- on ac, no asa.  would defer statin in setting of LDLin the 70's.  


- can consider pvr's given LE ulcer.  





kari/ckd - currently gfr in 30's, was 47 9/2017


- improving on ivf, con't

## 2018-01-12 NOTE — HOSP
Subjective





- Review of Symptoms


Events since last encounter: 





verbal report of possible thrombus in right atria on echo obtained


Subjective: 





pt with c/o being "terrible" unable to give specific complaints. pt has 

dementia and is unable or unwilling to provide further details





Physical Examination


Vital Signs: 


 Vital Signs











Temperature  98.3 F   01/12/18 20:14


 


Pulse Rate  88   01/12/18 20:14


 


Respiratory Rate  18   01/12/18 20:14


 


Blood Pressure  138/95   01/12/18 20:14


 


O2 Sat by Pulse Oximetry (%)  97   01/12/18 20:14











Constitutional: Yes: Other (mildly uncooperative)


Cardiovascular: Yes: Regular Rate and Rhythm, S1, S2


Respiratory: Yes: CTA Bilaterally


Gastrointestinal: Yes: Normal Bowel Sounds, Soft, Tenderness (on deep palpation)


Labs: 


 CBC, BMP





 01/12/18 07:30 





 01/12/18 07:30 











Hospitalist Encounter


Assessment: 





B/L DVT, possible thrombus in RA


   - DW dr. Irene, will change from xarelto to heparin for now, hematology 

consult


   - there is concern for the RA thrombus to break off and travel causing PE. 

At this time pt is stable, showing no signs of PE, Cr 1.4, unable to do CTA, 

discussed possibility of transfer to [facility] where there is 

ICU availabe; however treatment plan would likely be unchanged from current. 

Will defer transfer unless acute CIC or deterioration.


   - TC placed to brother, Kash Patricia to discuss goals of care as well as 

advanced directives. Message left on voicemail. [phone], she also has a 

close friend in the area who looks in on her from time to time: brittney Patricia 

[phone] and I updated her briefly on her condition when she called the 

unit.

## 2018-01-13 LAB
ALBUMIN SERPL-MCNC: 2.5 G/DL (ref 3.5–5)
ALP SERPL-CCNC: 56 U/L (ref 32–92)
ALT SERPL-CCNC: 9 U/L (ref 10–40)
ANION GAP SERPL CALC-SCNC: 9 MMOL/L (ref 8–16)
AST SERPL-CCNC: 17 U/L (ref 10–42)
BILIRUB SERPL-MCNC: 1 MG/DL (ref 0.2–1)
BUN SERPL-MCNC: 18 MG/DL (ref 7–18)
CALCIUM SERPL-MCNC: 8 MG/DL (ref 8.4–10.2)
CHLORIDE SERPL-SCNC: 106 MMOL/L (ref 98–107)
CO2 SERPL-SCNC: 23 MMOL/L (ref 22–28)
CREAT SERPL-MCNC: 1.3 MG/DL (ref 0.6–1.3)
DEPRECATED RDW RBC AUTO: 13.8 % (ref 11.6–15.6)
GLUCOSE SERPL-MCNC: 131 MG/DL (ref 74–106)
HCT VFR BLD CALC: 33.9 % (ref 32.4–45.2)
HGB BLD-MCNC: 10.7 GM/DL (ref 10.7–15.3)
MAGNESIUM SERPL-MCNC: 2.1 MG/DL (ref 1.8–2.4)
MCH RBC QN AUTO: 27.8 PG (ref 25.7–33.7)
MCHC RBC AUTO-ENTMCNC: 31.6 G/DL (ref 32–36)
MCV RBC: 88.2 FL (ref 80–96)
PHOSPHATE SERPL-MCNC: 3.8 MG/DL (ref 2.5–4.6)
PLATELET # BLD AUTO: 222 K/MM3 (ref 134–434)
PLATELET BLD QL SMEAR: ADEQUATE
PMV BLD: 8.9 FL (ref 7.5–11.1)
POTASSIUM SERPLBLD-SCNC: 4.4 MMOL/L (ref 3.5–5.1)
PROT SERPL-MCNC: 5.6 G/DL (ref 6.4–8.3)
RBC # BLD AUTO: 3.85 M/MM3 (ref 3.6–5.2)
RBC MORPH BLD: YES
SODIUM SERPL-SCNC: 138 MMOL/L (ref 136–145)
WBC # BLD AUTO: 16 K/MM3 (ref 4–10.8)

## 2018-01-13 RX ADMIN — HEPARIN SODIUM SCH MLS/HR: 5000 INJECTION, SOLUTION INTRAVENOUS at 10:06

## 2018-01-13 RX ADMIN — HEPARIN SODIUM PRN UNIT: 5000 INJECTION, SOLUTION INTRAVENOUS; SUBCUTANEOUS at 00:27

## 2018-01-13 RX ADMIN — INSULIN ASPART SCH UNITS: 100 INJECTION, SOLUTION INTRAVENOUS; SUBCUTANEOUS at 21:32

## 2018-01-13 RX ADMIN — ATENOLOL SCH MG: 50 TABLET ORAL at 10:00

## 2018-01-13 RX ADMIN — HEPARIN SODIUM SCH MLS/HR: 5000 INJECTION, SOLUTION INTRAVENOUS at 00:45

## 2018-01-13 RX ADMIN — INSULIN ASPART SCH UNITS: 100 INJECTION, SOLUTION INTRAVENOUS; SUBCUTANEOUS at 16:34

## 2018-01-13 RX ADMIN — DONEPEZIL HYDROCHLORIDE SCH MG: 5 TABLET, FILM COATED ORAL at 21:32

## 2018-01-13 NOTE — PN
Physical Exam: 


SUBJECTIVE: Patient seen and examined at bedside.








OBJECTIVE:





 Vital Signs











 Period  Temp  Pulse  Resp  BP Sys/Ramsey  Pulse Ox


 


 Last 24 Hr  97.4 F-98.3 F  85-88  18-19  102-138/60-95  











GENERAL: The patient is awake, alert, and oriented x2 (Knows name, "[facility]"), 

in no acute distress. Gives deliberate responses.


LUNGS: Breath sounds equal, clear to auscultation bilaterally, no wheezes, no 

crackles, no accessory muscle use. 


HEART: Irregular rhythm, S1, S2 without murmur, rub or gallop.


ABDOMEN: Soft, nontender, nondistended, normoactive bowel sounds, no guarding, 

no rebound, no hepatosplenomegaly, no masses.


EXTREMITIES: 2+ pulses, warm, well-perfused, no edema. Large area of excised 

skin on lateral aspect, wound bed is very clean, clean edges, no active bleeding


NEUROLOGICAL: Cranial nerves II through XII grossly intact. Normal speech, gait 

not observed. 


PSYCH: Normal mood, normal affect.


SKIN: Large area of excised skin on lateral aspect, wound bed is very clean, 

clean edges, no active bleeding











 Laboratory Results - last 24 hr











  01/11/18 01/12/18 01/12/18





  15:30 08:00 11:30


 


WBC   


 


RBC   


 


Hgb   


 


Hct   


 


MCV   


 


MCH   


 


MCHC   


 


RDW   


 


Plt Count   


 


MPV   


 


Neutrophils %   


 


Lymphocytes %   


 


PTT (Actin FS)   


 


Sodium   


 


Potassium   


 


Chloride   


 


Carbon Dioxide   


 


Anion Gap   


 


BUN   


 


Creatinine   


 


Creat Clearance w eGFR   


 


Random Glucose   


 


Lactic Acid  Cancelled  


 


Calcium   


 


Phosphorus   


 


Magnesium   


 


Total Bilirubin   


 


AST   


 


ALT   


 


Alkaline Phosphatase   


 


Creatine Kinase   31 


 


Troponin I   0.12 


 


Total Protein   


 


Albumin   


 


Triglycerides    121  D


 


Cholesterol    119


 


Total LDL Cholesterol    73


 


HDL Cholesterol    22 L D














  01/12/18 01/12/18 01/13/18





  15:00 20:30 08:52


 


WBC    16.0 H


 


RBC    3.85


 


Hgb    10.7


 


Hct    33.9


 


MCV    88.2


 


MCH    27.8


 


MCHC    31.6 L


 


RDW    13.8


 


Plt Count    222


 


MPV    8.9


 


Neutrophils %    No Result Required.


 


Lymphocytes %    No Result Required.


 


PTT (Actin FS)   30.4 


 


Sodium   


 


Potassium   


 


Chloride   


 


Carbon Dioxide   


 


Anion Gap   


 


BUN   


 


Creatinine   


 


Creat Clearance w eGFR   


 


Random Glucose   


 


Lactic Acid   


 


Calcium   


 


Phosphorus   


 


Magnesium   


 


Total Bilirubin   


 


AST   


 


ALT   


 


Alkaline Phosphatase   


 


Creatine Kinase  28  


 


Troponin I  0.07  


 


Total Protein   


 


Albumin   


 


Triglycerides   


 


Cholesterol   


 


Total LDL Cholesterol   


 


HDL Cholesterol   














  01/13/18 01/13/18





  08:52 08:52


 


WBC  


 


RBC  


 


Hgb  


 


Hct  


 


MCV  


 


MCH  


 


MCHC  


 


RDW  


 


Plt Count  


 


MPV  


 


Neutrophils %  


 


Lymphocytes %  


 


PTT (Actin FS)   79.6 H


 


Sodium  138 


 


Potassium  4.4 


 


Chloride  106 


 


Carbon Dioxide  23 


 


Anion Gap  9 


 


BUN  18  D 


 


Creatinine  1.3 


 


Creat Clearance w eGFR  38.93 


 


Random Glucose  131 H 


 


Lactic Acid  


 


Calcium  8.0 L 


 


Phosphorus  3.8 


 


Magnesium  2.1 


 


Total Bilirubin  1.0  D 


 


AST  17 


 


ALT  9 L 


 


Alkaline Phosphatase  56 


 


Creatine Kinase  


 


Troponin I  


 


Total Protein  5.6 L 


 


Albumin  2.5 L 


 


Triglycerides  


 


Cholesterol  


 


Total LDL Cholesterol  


 


HDL Cholesterol  








Active Medications











Generic Name Dose Route Start Last Admin





  Trade Name Freq  PRN Reason Stop Dose Admin


 


Atenolol  50 mg  01/12/18 10:00  01/12/18 10:19





  Tenormin -  PO   50 mg





  DAILY JOSE L   Administration


 


Donepezil HCl  5 mg  01/12/18 22:00  01/12/18 23:20





  Aricept -  PO   Not Given





  HS JOSE L   


 


Heparin Sodium (Porcine)  1,000 unit  01/12/18 19:18  





  Heparin -  IVPUSH   





  PRN PRN   





  Heparin   


 


Heparin Sodium (Porcine)  5,000 unit  01/12/18 19:18  01/13/18 00:27





  Heparin -  IVPUSH   5,000 unit





  PRN PRN   Administration





  Heparin   


 


Sodium Chloride  1,000 mls @ 42 mls/hr  01/11/18 23:45  01/12/18 23:45





  Normal Saline -  IV   42 mls/hr





  ASDIR JOSE L   Administration


 


HEPARIN SOD,PORK IN 0.45% NACL  25,000 units in 500 mls @ 20 mls/hr  01/12/18 21

:30  01/13/18 10:06





  Heparin-1/2ns 25,000 Units/500  IVPB   750 units/hr





  TITR JOSE L   15 mls/hr





  Protocol   Administration





  1,000 UNITS/HR   











Echo 1/12/18: nl LV/EF. RV tds. dilated LA/RA. large complex echodensity in RA, 

protrudes into IVC c/w thrombus. mod TR. RVSP 47.





LE duplex: bilat DVTs (extensive RLE)





Carotid dopplers: 


Small->mod plaques in R common carotid. No hemodynamically significant stenosis.


Mod plaques L common carotid. No hemodynamically significant stenosis.





ASSESSMENT/PLAN:


A:


86yo woman with dementia, DVT, afib who now has RA thrombus on heparin gtt.





P:


RA thrombus


 - heparin gtt


 - cards following


 - will need CTA when Cr normalizes





Bilateral DVT


 - heparin gtt





Syncope


 - continous cardiac monitoring


 - orthostatic vital signs


 - Carotid dopplers as above





elevated troponin


 - Likely demand ischemia


 - cards following





afib


 - atenolol


 - heparin gtt





Systolic heart failure


 - hypovolemic hold lasix, 


 - strict i/o 


 - daily weights 


   


SHIVA


 - repeat Cr 1.3  baseline 1.1, likely prerenal


 - urine lytes pending


 - hold home lasix


 - hold lisinopril


 - NS@42





Right lower extremity wound


 - continue xeroform and sterile gauze





leukocytosis


 - wbc 13.5, pt is afebrile,  pending am labs, follow up blood and urine 

cultures


 - moist cough noted on exam, repeat cxr





Dementia


 - continue donepezil





FEN


 - NS @ 42mL/hr


 - replete prn


 - Low Na diet





PPX


 - heparin gtt


 - PT





Dispo: Transfer to [facility] for telemetry monitoring. Full code.





Visit type





- Emergency Visit


Emergency Visit: Yes


ED Registration Date: 01/12/18


Care time: The patient presented to the Emergency Department on the above date 

and was hospitalized for further evaluation of their emergent condition.





- New Patient


This patient is new to me today: Yes


Date on this admission: 01/13/18





- Critical Care


Critical Care patient: No

## 2018-01-13 NOTE — PN
Progress Note, Physician


Chief Complaint: 





RA mass, syncope


History of Present Illness: 





sleepy but wakes up to talk.


denies sob ("not really").


no cp/pressure


no palpitations, leg swelling





no cigs





- Current Medication List


Current Medications: 


Active Medications





Atenolol (Tenormin -)  50 mg PO DAILY [facility]


   Last Admin: 01/12/18 10:19 Dose:  50 mg


Donepezil HCl (Aricept -)  5 mg PO HS [facility]


   Last Admin: 01/12/18 23:20 Dose:  Not Given


Heparin Sodium (Porcine) (Heparin -)  1,000 unit IVPUSH PRN PRN


   PRN Reason: Heparin


Heparin Sodium (Porcine) (Heparin -)  5,000 unit IVPUSH PRN PRN


   PRN Reason: Heparin


   Last Admin: 01/13/18 00:27 Dose:  5,000 unit


Sodium Chloride (Normal Saline -)  1,000 mls @ 42 mls/hr IV ASDIR [facility]


   Last Admin: 01/12/18 23:45 Dose:  42 mls/hr


HEPARIN SOD,PORK IN 0.45% NACL (Heparin-1/2ns 25,000 Units/500)  25,000 units 

in 500 mls @ 20 mls/hr IVPB TITR JOSE L; 1,000 UNITS/HR


   PRN Reason: Protocol


   Last Admin: 01/13/18 00:45 Dose:  800 units/hr, 16 mls/hr











- Objective


Vital Signs: 


 Vital Signs











Temperature  97.8 F   01/13/18 06:00


 


Pulse Rate  88   01/13/18 06:00


 


Respiratory Rate  18   01/13/18 06:00


 


Blood Pressure  102/60   01/13/18 06:00


 


O2 Sat by Pulse Oximetry (%)  93 L  01/13/18 06:00











Constitutional: Yes: No Distress, Calm


Eyes: No: Sclera Icterus


HENT: No: Nasal Congestion


Cardiovascular: Yes: Pulse Irregular, JVD (4-5 cm), S1, S2, Other (PMI non 

diplaced).  No: Gallop, Murmur


Respiratory: Yes: CTA Bilaterally.  No: Accessory Muscle Use, Rales, Wheezes


Gastrointestinal: Yes: Normal Bowel Sounds, Soft.  No: Tenderness


Musculoskeletal: Yes: Other (No kyphosis)


Extremities: No: Cold


Edema: No


Integumentary: No: Jaundice


Neurological: Yes: Alert, Oriented (x3)


Psychiatric: No: Agitated


Labs: 


 CBC, BMP





 01/12/18 07:30 





 01/12/18 07:30 





 INR, PTT











INR  1.33  (0.82-1.09)  H  01/11/18  15:30    














- ....Imaging


EKG: Other (tele: afib, good HRs)





Assessment/Plan





Echo 1/12/18: nl LV/EF. RV tds. dilated LA/RA. large complex echodensity in RA, 

protrudes into IVC c/w thrombus. mod TR. RVSP 47.





LE duplex: bilat DVTs (extensive RLE)








85 year-old female with a PMH significant for HTN, atrial fibrillation off a/c 

x 3 weeks, ? systolic heart failure, carotid artery disease, venous stasis 

ulcers, and dementia. + upper respiratory symptoms, including a dry cough, x 10 

days. At PMD office on DOA she became cold and clammy and she had a brief 

episode of unresponsiveness. Her blood pressure was noted to be low. Returned 

to baseline mental status. In the ED, the patient was at her baseline mental 

status. Patient was taken off Xarelto three weeks ago following a fall and 

injury to her right leg causing a hematoma which required surgical incision at 

[facility].








extensive LE DVT, RA thrombus:


-probable thrombus in RA, extending from IVC. likely from extensive RLE thrombus

, in transit.


-DVT ? provoked, given recent fall with injury and surgical procedure (? 

duration of hospitalization/immobilization since)


-no PE study done here, will not likely change mgmt as duration of AC will be 

similar regardless.


-creat improving with IVF, cont same--GFR 38. once GFR comes up slightly more, 

will do CTA, in case of later questions arising regarding duration of AC (falls 

history noted), 


-for now, continue UFH as doing


-monitor 


for signs of resp distress/hypoxia (normal sats thus far)


-rec transfer to telemetry (4W) or medical step-down (4S) at [facility] when/if bed 

becomes available


-if she remains clinically stable, will rec repeat imaging after 2-4 weeks of AC

, expect thrombus to organize and/or break up


-rec malignancy w/u including for renal cell carcinoma (will start with renal 

sono). rec hematology consult





syncope:


-brief LOC with hypotension on DOA at PMD office


-likely sec to PE or ? right sided cardiac output obstruction from RA mass


-transient sx's, hemodynamically stable since


-mgmt plan as above


-d/w dr santiago, crit care--safe to defer ICU monitoring as she has been clinically 

stable x 24 hrs.





Afib:


-HR controlled, cont atenolol


-AC as above





HTN:


-bp running low-normal range


-cont atenolol, observe bp trend





SHIVA:


-likely vol depleted/prerenal


-numbers improving with IVF

## 2018-01-14 LAB
ALBUMIN SERPL-MCNC: 2.3 G/DL (ref 3.4–5)
ALP SERPL-CCNC: 80 U/L (ref 45–117)
ALT SERPL-CCNC: 11 U/L (ref 12–78)
ANION GAP SERPL CALC-SCNC: 7 MMOL/L (ref 8–16)
AST SERPL-CCNC: 11 U/L (ref 15–37)
BASOPHILS # BLD: 0.7 % (ref 0–2)
BILIRUB SERPL-MCNC: 0.9 MG/DL (ref 0.2–1)
BUN SERPL-MCNC: 16 MG/DL (ref 7–18)
CALCIUM SERPL-MCNC: 7.5 MG/DL (ref 8.5–10.1)
CHLORIDE SERPL-SCNC: 109 MMOL/L (ref 98–107)
CO2 SERPL-SCNC: 26 MMOL/L (ref 21–32)
CREAT SERPL-MCNC: 1.2 MG/DL (ref 0.55–1.02)
DEPRECATED RDW RBC AUTO: 14.7 % (ref 11.6–15.6)
EOSINOPHIL # BLD: 0.3 % (ref 0–4.5)
GLUCOSE SERPL-MCNC: 127 MG/DL (ref 74–106)
HCT VFR BLD CALC: 32.8 % (ref 32.4–45.2)
HGB BLD-MCNC: 10.6 GM/DL (ref 10.7–15.3)
LYMPHOCYTES # BLD: 21.3 % (ref 8–40)
MCH RBC QN AUTO: 28 PG (ref 25.7–33.7)
MCHC RBC AUTO-ENTMCNC: 32.1 G/DL (ref 32–36)
MCV RBC: 87.2 FL (ref 80–96)
MONOCYTES # BLD AUTO: 9.6 % (ref 3.8–10.2)
NEUTROPHILS # BLD: 68.1 % (ref 42.8–82.8)
PLATELET # BLD AUTO: 222 K/MM3 (ref 134–434)
PMV BLD: 9 FL (ref 7.5–11.1)
POTASSIUM SERPLBLD-SCNC: 4.4 MMOL/L (ref 3.5–5.1)
PROT SERPL-MCNC: 5.6 G/DL (ref 6.4–8.2)
RBC # BLD AUTO: 3.77 M/MM3 (ref 3.6–5.2)
SODIUM SERPL-SCNC: 142 MMOL/L (ref 136–145)
WBC # BLD AUTO: 13.4 K/MM3 (ref 4–10)

## 2018-01-14 RX ADMIN — INSULIN ASPART SCH: 100 INJECTION, SOLUTION INTRAVENOUS; SUBCUTANEOUS at 21:08

## 2018-01-14 RX ADMIN — INSULIN ASPART SCH UNITS: 100 INJECTION, SOLUTION INTRAVENOUS; SUBCUTANEOUS at 11:27

## 2018-01-14 RX ADMIN — SODIUM CHLORIDE SCH MLS/HR: 9 INJECTION, SOLUTION INTRAVENOUS at 06:16

## 2018-01-14 RX ADMIN — HEPARIN SODIUM SCH: 5000 INJECTION, SOLUTION INTRAVENOUS at 00:56

## 2018-01-14 RX ADMIN — INSULIN ASPART SCH: 100 INJECTION, SOLUTION INTRAVENOUS; SUBCUTANEOUS at 21:03

## 2018-01-14 RX ADMIN — INSULIN ASPART SCH: 100 INJECTION, SOLUTION INTRAVENOUS; SUBCUTANEOUS at 06:14

## 2018-01-14 RX ADMIN — ATENOLOL SCH MG: 50 TABLET ORAL at 09:16

## 2018-01-14 RX ADMIN — INSULIN ASPART SCH UNITS: 100 INJECTION, SOLUTION INTRAVENOUS; SUBCUTANEOUS at 16:55

## 2018-01-14 RX ADMIN — HEPARIN SODIUM SCH: 5000 INJECTION, SOLUTION INTRAVENOUS at 21:30

## 2018-01-14 RX ADMIN — DONEPEZIL HYDROCHLORIDE SCH MG: 5 TABLET, FILM COATED ORAL at 21:03

## 2018-01-14 RX ADMIN — HEPARIN SODIUM SCH: 5000 INJECTION, SOLUTION INTRAVENOUS at 21:32

## 2018-01-14 RX ADMIN — SODIUM CHLORIDE SCH: 9 INJECTION, SOLUTION INTRAVENOUS at 00:56

## 2018-01-14 NOTE — CON.PULM
Consult


Consult Specialty:: PULMONARY


Referred by:: MIRELLA Stone


Reason for Consultation:: r/o PE





- History of Present Illness


Chief Complaint: shortness of breath


History of Present Illness: 





84yo female with h/o HTN, atrial fibrillation, CAD, LV systolic dysfunction, 

venous stasis ulcers, dementia who was admitted with syncopal episode. Found to 

have extensive RLE DVT and popliteal DVT on LLE. Per cardiology note, there was 

a possible RA thrombus extending from the IVC on ?echo although no final 

reports available. Pt denies any shortness of breath or chest pain but does 

report some occasional palpitations. Pt unable to provide reliable history due 

to her dementia.





- History Source


History Provided By: Patient, Medical Record


Limitations to Obtaining History: Dementia





- Past Medical History


Cardio/Vascular: Yes: AFIB, HTN





- Alcohol/Substance Use


Hx Alcohol Use: No





- Smoking History


Smoking history: Never smoked





Home Medications





- Allergies


Allergies/Adverse Reactions: 


 Allergies











Allergy/AdvReac Type Severity Reaction Status Date / Time


 


iodine Allergy   Verified 01/11/18 15:06


 


Penicillins Allergy   Verified 01/11/18 15:06


 


donepezil AdvReac Intermediate abdominal Verified 01/11/18 15:06





   pain  














- Home Medications


Home Medications: 


Ambulatory Orders





Cholecalciferol (Vitamin D3) [Vitamin D3] 2,000 unit PO DAILY  tablet 09/05/17 


Donepezil HCl [Aricept] 5 mg PO HS 01/11/18 


Furosemide [Lasix] 20 mg PO BID 01/11/18 


Potassium Chloride [K-Dur -] 20 meq PO BID 01/11/18 











Review of Systems





- Review of Systems


Constitutional: denies: Chills, Fever


Eyes: denies: Recent Change in Vision


HENT: denies: Nasal Congestion, Throat Pain


Neck: denies: Stiffness, Tenderness


Cardiovascular: reports: Palpitations.  denies: Chest Pain, Shortness of Breath


Respiratory: denies: Cough, Hemoptysis, SOB


Gastrointestinal: denies: Abdominal Pain, Nausea, Vomiting


Genitourinary: denies: Dysuria, Hematuria


Neurological: reports: Syncope.  denies: Dizziness, Headache





Physical Exam


Vital Sings: 


 Vital Signs











Temperature  98.3 F   01/14/18 14:49


 


Pulse Rate  92 H  01/14/18 14:49


 


Respiratory Rate  20   01/14/18 14:49


 


Blood Pressure  109/69   01/14/18 14:49


 


O2 Sat by Pulse Oximetry (%)  99   01/14/18 09:00











Constitutional: Yes: Mild Distress (mildly tachypneic)


Eyes: Yes: Conjunctiva Clear, EOM Intact


HENT: Yes: Atraumatic, Normocephalic


Neck: Yes: Supple, Trachea Midline


Cardiovascular: Yes: Regular Rate and Rhythm


Respiratory: Yes: Diminished (decreased breath sounds at the bases)


...Clubbing: No


Gastrointestinal: Yes: Normal Bowel Sounds, Soft.  No: Tenderness


Edema: Yes


Neurological: Yes: Confusion


Labs: 


 CBC, BMP





 01/14/18 07:40 





 01/14/18 07:40 











Imaging





- Results


Chest X-ray: Report Reviewed, Image Reviewed (no infiltrates)





Problem List





- Problems


(1) DVT (deep venous thrombosis)


Code(s): I82.409 - ACUTE EMBOLISM AND THOMBOS UNSP DEEP VN UNSP LOWER EXTREMITY

   





(2) Right atrial thrombus


Code(s): SXF1383 -    





(3) IVC thrombosis


Code(s): I82.220 - ACUTE EMBOLISM AND THROMBOSIS OF INFERIOR VENA CAVA   





(4) Acute kidney injury


Code(s): N17.9 - ACUTE KIDNEY FAILURE, UNSPECIFIED   





(5) Elevated troponin


Code(s): R74.8 - ABNORMAL LEVELS OF OTHER SERUM ENZYMES   





(6) Lactic acidosis


Code(s): E87.2 - ACIDOSIS   





(7) Atrial fibrillation


Code(s): I48.91 - UNSPECIFIED ATRIAL FIBRILLATION   





(8) CAD (coronary artery disease)


Code(s): I25.10 - ATHSCL HEART DISEASE OF NATIVE CORONARY ARTERY W/O ANG PCTRS 

  





(9) Systolic dysfunction, left ventricle


Code(s): I51.9 - HEART DISEASE, UNSPECIFIED   





(10) Dementia


Code(s): F03.90 - UNSPECIFIED DEMENTIA WITHOUT BEHAVIORAL DISTURBANCE   





Assessment/Plan





Extensive RLE DVT


RA/IVC thrombus


Likely PE


Acute Kidney Injury


Atrial Fibrillation


+Troponins


LV Systolic Dysfunction


Dementia





-  continue anticoagulation


-  echocardiogram


-  would consult IR to evaluate for catheter directed thrombectomy/thrombolysis


-  O2 to keep Spo2 >90%


-  IVF


-  monitor urine output, creatinine


-  telemetry monitoring








Thank you for this consult


Toni Haney MD

## 2018-01-14 NOTE — PN
Physical Exam: 


SUBJECTIVE: Patient seen and examined


 Reports RLE pain improving,c/o persistent cough, non- productive, denies cp, 

sob, palpitations, abdominal pain, N/V/D or urinary symptoms.





OBJECTIVE:





 Vital Signs











 Period  Temp  Pulse  Resp  BP Sys/Ramsey  Pulse Ox


 


 Last 24 Hr  97.7 F-98.4 F    18-20  101-149/54-89  











GENERAL: The patient is awake, alert, and follows commands, no acute distress.


HEAD: Normal with no signs of trauma.


EYES: PERRL, extraocular movements intact, sclera anicteric, conjunctiva clear. 

No ptosis. 


ENT: Ears normal, nares patent, oropharynx clear without exudates, moist mucous 


membranes.


NECK: Trachea midline, full range of motion, supple. 


LUNGS: Breath sounds equal, clear to auscultation bilaterally, no wheezes, no 

crackles, no 


accessory muscle use. 


HEART: Irregular rate and rhythm,without murmur, rub or gallop.


ABDOMEN: Soft, nontender, nondistended, normoactive bowel sounds, no guarding, 

no 


rebound, no hepatosplenomegaly, no masses.


EXTREMITIES: 2+ pulses, warm, well-perfused, no edema. 


NEUROLOGICAL: Cranial nerves II through XII grossly intact. Normal speech, gait 

not 


observed.


PSYCH: Normal mood, normal affect.


SKIN: Warm, dry, normal turgor, RLE wound intact with dsg














 Laboratory Results - last 24 hr











  01/13/18 01/13/18 01/13/18





  08:52 16:32 21:31


 


WBC   


 


RBC   


 


Hgb   


 


Hct   


 


MCV   


 


MCH   


 


MCHC   


 


RDW   


 


Plt Count   


 


MPV   


 


Neutrophils %   


 


Neutrophils % (Manual)  74.0  


 


Band Neutrophils %  4.0  


 


Lymphocytes %   


 


Lymphocytes % (Manual)  18.0  


 


Monocytes %   


 


Monocytes % (Manual)  4  


 


Eosinophils %   


 


Basophils %   


 


Platelet Estimate  Adequate  


 


PTT (Actin FS)   


 


Sodium   


 


Potassium   


 


Chloride   


 


Carbon Dioxide   


 


Anion Gap   


 


BUN   


 


Creatinine   


 


Creat Clearance w eGFR   


 


POC Glucometer   209  235


 


Random Glucose   


 


Calcium   


 


Total Bilirubin   


 


AST   


 


ALT   


 


Alkaline Phosphatase   


 


Troponin I   


 


Total Protein   


 


Albumin   














  01/14/18 01/14/18 01/14/18





  06:13 07:40 07:40


 


WBC    13.4 H


 


RBC    3.77


 


Hgb    10.6 L


 


Hct    32.8


 


MCV    87.2


 


MCH    28.0


 


MCHC    32.1


 


RDW    14.7


 


Plt Count    222


 


MPV    9.0


 


Neutrophils %    68.1


 


Neutrophils % (Manual)   


 


Band Neutrophils %   


 


Lymphocytes %    21.3


 


Lymphocytes % (Manual)   


 


Monocytes %    9.6


 


Monocytes % (Manual)   


 


Eosinophils %    0.3


 


Basophils %    0.7


 


Platelet Estimate   


 


PTT (Actin FS)   53.1 H 


 


Sodium   


 


Potassium   


 


Chloride   


 


Carbon Dioxide   


 


Anion Gap   


 


BUN   


 


Creatinine   


 


Creat Clearance w eGFR   


 


POC Glucometer  122  


 


Random Glucose   


 


Calcium   


 


Total Bilirubin   


 


AST   


 


ALT   


 


Alkaline Phosphatase   


 


Troponin I   


 


Total Protein   


 


Albumin   














  01/14/18 01/14/18





  07:40 07:40


 


WBC  


 


RBC  


 


Hgb  


 


Hct  


 


MCV  


 


MCH  


 


MCHC  


 


RDW  


 


Plt Count  


 


MPV  


 


Neutrophils %  


 


Neutrophils % (Manual)  


 


Band Neutrophils %  


 


Lymphocytes %  


 


Lymphocytes % (Manual)  


 


Monocytes %  


 


Monocytes % (Manual)  


 


Eosinophils %  


 


Basophils %  


 


Platelet Estimate  


 


PTT (Actin FS)  


 


Sodium  142 


 


Potassium  4.4 


 


Chloride  109 H 


 


Carbon Dioxide  26 


 


Anion Gap  7 L 


 


BUN  16 


 


Creatinine  1.2 H 


 


Creat Clearance w eGFR  42.70 


 


POC Glucometer  


 


Random Glucose  127 H 


 


Calcium  7.5 L 


 


Total Bilirubin  0.9  D 


 


AST  11 L D 


 


ALT  11 L D 


 


Alkaline Phosphatase  80 


 


Troponin I   Cancelled


 


Total Protein  5.6 L 


 


Albumin  2.3 L 








Active Medications











Generic Name Dose Route Start Last Admin





  Trade Name Freq  PRN Reason Stop Dose Admin


 


Atenolol  50 mg  01/12/18 10:00  01/14/18 09:16





  Tenormin -  PO   50 mg





  DAILY JOSE L   Administration


 


Donepezil HCl  5 mg  01/12/18 22:00  01/13/18 21:32





  Aricept -  PO   5 mg





  HS JOSE L   Administration


 


Heparin Sodium (Porcine)  1,000 unit  01/12/18 19:18  





  Heparin -  IVPUSH   





  PRN PRN   





  Heparin   


 


Heparin Sodium (Porcine)  5,000 unit  01/12/18 19:18  01/13/18 00:27





  Heparin -  IVPUSH   5,000 unit





  PRN PRN   Administration





  Heparin   


 


Sodium Chloride  1,000 mls @ 42 mls/hr  01/11/18 23:45  01/14/18 06:16





  Normal Saline -  IV   42 mls/hr





  ASDIR JOSE L   Administration


 


HEPARIN SOD,PORK IN 0.45% NACL  25,000 units in 500 mls @ 20 mls/hr  01/12/18 21

:30  01/14/18 00:56





  Heparin-1/2ns 25,000 Units/500  IVPB   Not Given





  TITR JOSE L   





  Protocol   





  1,000 UNITS/HR   


 


Insulin Aspart  1 vial  01/13/18 16:30  01/14/18 06:14





  Novolog Vial Sliding Scale -  SQ   Not Given





  ACHS JOSE L   





  Protocol   








* IMAGING 


Echo 1/12/18: nl LV/EF. RV tds. dilated LA/RA. large complex echodensity in RA, 

protrudes into IVC c/w thrombus. mod TR. RVSP 47.





LE duplex: bilateral DVTs (extensive RLE)





Carotid dopplers: Small->mod plaques in R common carotid. No hemodynamically 

significant stenosis.Mod plaques L common carotid. No hemodynamically 

significant stenosis.





CXR: No acute pathology 


 


CT Head : No intracranial pathology 








ASSESSMENT/PLAN:


 This is an 85 year-old female with a PMH significant for HTN, atrial 

fibrillation off a/c x 3 weeks following a fall and injury to her right leg 

causing a hematoma which required surgical incision at [facility],systolic heart 

failure, carotid artery disease, venous stasis ulcers,and dementia, admitted 

with syncope and URI symptoms. Found to have bilateral DVTs and RA thrombus.








*RA thrombus


 -will cont on heparin gtt


 - cards following


 - pt is allergic to Iodine,will hold off on CTA 


- will get pul consult and need VQ scan to r/o PE 





*Bilateral DVT


 - on heparin gtt





*Syncope, possibly due to low BP/ dehydration 


 - continuos cardiac monitoring- no cardiac events 


 - orderd orthostatic vital signs


 - Carotid dopplers-No hemodynamically significant stenosis, mod stenosis 





* Mildly elevated troponin - Likely demand ischemia- Trop level trending down 


 - cards following


-Echo 1/12/18: nl LV/EF. RV tds. dilated LA/RA. large complex echodensity in RA

, protrudes into IVC c/w thrombus. mod TR. RVSP 47.





*afib- HR controlled 


 -will cont on  atenolol and  heparin gtt





*Systolic heart failure- hypovolemic


- holding off lasix, 


 - strict i/o 


 - daily weights 


   


*SHIVA


 - repeat Cr 1.3  baseline 1.1, likely prerenal- improving 


 - urine lytes pending


 - hold home lasix


 - hold lisinopril


 - NS@42





*Right lower extremity wound


 - continue xeroform and sterile gauze


- out pt surgical followup





*leukocytosis- trending down 


- afebrile 


-Influenza ruled out 


- cxr- NAD


- BC preliminary negative  


  


*Dementia-


 - continue donepezil





*FEN


 - NS @ 42mL/hr


 - replete prn


 - Low Na diet





PPX


 - heparin gtt


 - PT











Visit type





- Emergency Visit


Emergency Visit: Yes


ED Registration Date: 01/12/18


Care time: The patient presented to the Emergency Department on the above date 

and was hospitalized for further evaluation of their emergent condition.





- New Patient


This patient is new to me today: Yes


Date on this admission: 01/14/18





- Critical Care


Critical Care patient: No

## 2018-01-14 NOTE — PN
Progress Note, Physician


Chief Complaint: 





syncope


History of Present Illness: 





denies sob, cp, palpitations, syncope


coughing, no phlegm





no cigs





- Current Medication List


Current Medications: 


Active Medications





Atenolol (Tenormin -)  50 mg PO DAILY [facility]


   Last Admin: 01/13/18 10:00 Dose:  50 mg


Donepezil HCl (Aricept -)  5 mg PO HS [facility]


   Last Admin: 01/13/18 21:32 Dose:  5 mg


Heparin Sodium (Porcine) (Heparin -)  1,000 unit IVPUSH PRN PRN


   PRN Reason: Heparin


Heparin Sodium (Porcine) (Heparin -)  5,000 unit IVPUSH PRN PRN


   PRN Reason: Heparin


   Last Admin: 01/13/18 00:27 Dose:  5,000 unit


Sodium Chloride (Normal Saline -)  1,000 mls @ 42 mls/hr IV ASDIR JOSE L


   Last Admin: 01/14/18 06:16 Dose:  42 mls/hr


HEPARIN SOD,PORK IN 0.45% NACL (Heparin-1/2ns 25,000 Units/500)  25,000 units 

in 500 mls @ 20 mls/hr IVPB TITR JOSE L; 1,000 UNITS/HR


   PRN Reason: Protocol


   Last Admin: 01/14/18 00:56 Dose:  Not Given


Insulin Aspart (Novolog Vial Sliding Scale -)  1 vial SQ ACHS [facility]


   PRN Reason: Protocol


   Last Admin: 01/14/18 06:14 Dose:  Not Given











- Objective


Vital Signs: 


 Vital Signs











Temperature  97.7 F   01/14/18 06:00


 


Pulse Rate  90   01/14/18 07:38


 


Respiratory Rate  20   01/14/18 06:00


 


Blood Pressure  123/64   01/14/18 07:38


 


O2 Sat by Pulse Oximetry (%)  99   01/13/18 21:00











Constitutional: Yes: No Distress, Calm


Eyes: No: Sclera Icterus


HENT: No: Nasal Congestion


Cardiovascular: Yes: Regular Rate and Rhythm, S1, S2, Other (PMI non diplaced).

  No: JVD, Gallop, Murmur


Respiratory: Yes: CTA Bilaterally.  No: Accessory Muscle Use, Rales, Wheezes


Gastrointestinal: Yes: Normal Bowel Sounds, Soft.  No: Tenderness


Musculoskeletal: Yes: Other (No kyphosis)


Extremities: No: Cold, Cyanosis


Edema: No


Integumentary: No: Jaundice


Neurological: Yes: Alert.  No: Seizure


Psychiatric: No: Agitated


Labs: 


 INR, PTT











INR  1.33  (0.82-1.09)  H  01/11/18  15:30    














- ....Imaging


EKG: Other (tele: AF, good HR)





Assessment/Plan





Echo 1/12/18: nl LV/EF. RV tds. dilated LA/RA. large complex echodensity in RA, 

protrudes into IVC c/w thrombus. mod TR. RVSP 47.





LE duplex: bilat DVTs (extensive RLE)








85 year-old female with a PMH significant for HTN, atrial fibrillation off a/c 

x 3 weeks, ? systolic heart failure, carotid artery disease, venous stasis 

ulcers, and dementia. + upper respiratory symptoms, including a dry cough, x 10 

days. At PMD office on DOA she became cold and clammy and she had a brief 

episode of unresponsiveness. Her blood pressure was noted to be low. Returned 

to baseline mental status. In the ED, the patient was at her baseline mental 

status. Patient was taken off Xarelto three weeks ago following a fall and 

injury to her right leg causing a hematoma which required surgical incision at 

[facility].








extensive LE DVT, RA thrombus:


-probable thrombus in RA, extending from IVC. likely from extensive RLE thrombus

, in transit.


-DVT ? provoked, given recent fall with injury and surgical procedure (? 

duration of hospitalization/immobilization since)


-remains clinically stable.


-creat improved with fluids, GFR 40s now--CTA today [in case of later questions 

arising regarding duration of AC (falls history noted)]. 


-for now, continue UFH as doing


-if she remains clinically stable, will rec repeat imaging after 2-4 weeks of AC

, expect thrombus to organize and/or break up


-rec malignancy w/u including for renal cell carcinoma (will start with renal 

sono). rec hematology consult





syncope:


-brief LOC with hypotension on DOA at PMD office


-likely sec to PE or ? right sided cardiac output obstruction from RA mass


-transient sx's, hemodynamically stable since


-mgmt plan as above


-not orthostatic here





Afib:


-HR controlled, cont atenolol


-AC as above





HTN:


-bp controlled, same meds





SHIVA:


-likely vol depleted/prerenal


-numbers improving with IVF

## 2018-01-15 LAB
ANION GAP SERPL CALC-SCNC: 10 MMOL/L (ref 8–16)
BASOPHILS # BLD: 0.5 % (ref 0–2)
BUN SERPL-MCNC: 16 MG/DL (ref 7–18)
CALCIUM SERPL-MCNC: 8.1 MG/DL (ref 8.5–10.1)
CHLORIDE SERPL-SCNC: 110 MMOL/L (ref 98–107)
CO2 SERPL-SCNC: 22 MMOL/L (ref 21–32)
CREAT SERPL-MCNC: 1 MG/DL (ref 0.55–1.02)
DEPRECATED RDW RBC AUTO: 14.9 % (ref 11.6–15.6)
EOSINOPHIL # BLD: 0.2 % (ref 0–4.5)
GLUCOSE SERPL-MCNC: 131 MG/DL (ref 74–106)
HCT VFR BLD CALC: 33.8 % (ref 32.4–45.2)
HGB BLD-MCNC: 10.5 GM/DL (ref 10.7–15.3)
LYMPHOCYTES # BLD: 19.5 % (ref 8–40)
MCH RBC QN AUTO: 27.1 PG (ref 25.7–33.7)
MCHC RBC AUTO-ENTMCNC: 31.1 G/DL (ref 32–36)
MCV RBC: 87.1 FL (ref 80–96)
MONOCYTES # BLD AUTO: 7.5 % (ref 3.8–10.2)
NEUTROPHILS # BLD: 72.3 % (ref 42.8–82.8)
PLATELET # BLD AUTO: 229 K/MM3 (ref 134–434)
PMV BLD: 9.1 FL (ref 7.5–11.1)
POTASSIUM SERPLBLD-SCNC: 4.6 MMOL/L (ref 3.5–5.1)
RBC # BLD AUTO: 3.88 M/MM3 (ref 3.6–5.2)
SODIUM SERPL-SCNC: 142 MMOL/L (ref 136–145)
WBC # BLD AUTO: 15.4 K/MM3 (ref 4–10)

## 2018-01-15 RX ADMIN — INSULIN ASPART SCH: 100 INJECTION, SOLUTION INTRAVENOUS; SUBCUTANEOUS at 06:17

## 2018-01-15 RX ADMIN — INSULIN ASPART SCH UNITS: 100 INJECTION, SOLUTION INTRAVENOUS; SUBCUTANEOUS at 17:19

## 2018-01-15 RX ADMIN — HEPARIN SODIUM SCH MLS/HR: 5000 INJECTION, SOLUTION INTRAVENOUS at 20:41

## 2018-01-15 RX ADMIN — PREDNISONE SCH MG: 10 TABLET ORAL at 19:00

## 2018-01-15 RX ADMIN — ATENOLOL SCH MG: 50 TABLET ORAL at 09:31

## 2018-01-15 RX ADMIN — DONEPEZIL HYDROCHLORIDE SCH MG: 5 TABLET, FILM COATED ORAL at 21:05

## 2018-01-15 RX ADMIN — SODIUM CHLORIDE SCH MLS/HR: 9 INJECTION, SOLUTION INTRAVENOUS at 06:29

## 2018-01-15 RX ADMIN — INSULIN ASPART SCH UNITS: 100 INJECTION, SOLUTION INTRAVENOUS; SUBCUTANEOUS at 12:00

## 2018-01-15 RX ADMIN — INSULIN ASPART SCH: 100 INJECTION, SOLUTION INTRAVENOUS; SUBCUTANEOUS at 21:05

## 2018-01-15 NOTE — CONSULT
Consult - text type





- Consultation


Consultation Note: 





86yo female with h/o HTN, atrial fibrillation, CAD, LV systolic dysfunction, 

venous stasis ulcers, dementia who was admitted with syncopal episode. Found to 

have extensive RLE DVT and popliteal DVT on LLE. Per cardiology note, there was 

a possible RA thrombus extending from the IVC on ?echo although no final 

reports available. Pt denies any shortness of breath or chest pain but does 

report some occasional palpitations. Pt unable to provide reliable history due 

to her dementia.





Was recently admitted to [facility] aftera fall?. Xeralto held since 12/12. Now with 

extensie Dt. Had a recent episode of near syncope while at PMDs office last 

week and sent to ER for further w/u





- History Source


History Provided By: Patient, Medical Record


Limitations to Obtaining History: Dementia





- Past Medical History


Cardio/Vascular: Yes: AFIB, HTN





- Alcohol/Substance Use


Hx Alcohol Use: No





- Smoking History


Smoking history: Never smoked





Home Medications





- Allergies


Allergies/Adverse Reactions: 


 Allergies











Allergy/AdvReac Type Severity Reaction Status Date / Time


 


iodine Allergy   Verified 01/11/18 15:06


 


Penicillins Allergy   Verified 01/11/18 15:06


 


donepezil AdvReac Intermediate abdominal Verified 01/11/18 15:06





   pain  














- Home Medications


Home Medications: 


Ambulatory Orders





Cholecalciferol (Vitamin D3) [Vitamin D3] 2,000 unit PO DAILY  tablet 09/05/17 


Donepezil HCl [Aricept] 5 mg PO HS 01/11/18 


Furosemide [Lasix] 20 mg PO BID 01/11/18 


Potassium Chloride [K-Dur -] 20 meq PO BID 01/11/18 














Physical Exam


Vital Sings: 


 


 Last Vital Signs











Temp Pulse Resp BP Pulse Ox


 


 97.9 F   87   20   130/63   97 


 


 01/15/18 14:12  01/15/18 14:12  01/15/18 14:12  01/15/18 14:12  01/15/18 10:00








Eyes: Yes: Conjunctiva Clear, EOM Intact


HENT: Yes: Atraumatic, Normocephalic


Neck: Yes: Supple, Trachea Midline


Cardiovascular: Yes: Regular Rate and Rhythm


Respiratory: Yes: Diminished (decreased breath sounds at the bases)


Gastrointestinal: Yes: Normal Bowel Sounds, Soft.  No: Tenderness


Edema: Yes








 Abnormal Lab Results











  01/15/18 01/15/18 01/15/18





  06:30 06:30 06:30


 


WBC   15.4 H 


 


Hgb   10.5 L 


 


MCHC   31.1 L 


 


PTT (Actin FS)  45.0 H  


 


Chloride    110 H


 


Random Glucose    131 H


 


Calcium    8.1 L








Active Medications











Generic Name Dose Route Start Last Admin





  Trade Name Freq  PRN Reason Stop Dose Admin


 


Atenolol  50 mg  01/12/18 10:00  01/15/18 09:31





  Tenormin -  PO   50 mg





  DAILY JOSE L   Administration


 


Diphenhydramine HCl  50 mg  01/15/18 13:18  





  Benadryl -  PO  01/15/18 13:19  





  ONCE ONE   


 


Donepezil HCl  5 mg  01/12/18 22:00  01/14/18 21:03





  Aricept -  PO   5 mg





  HS JOSE L   Administration


 


Heparin Sodium (Porcine)  1,000 unit  01/12/18 19:18  01/15/18 09:31





  Heparin -  IVPUSH   1,000 unit





  PRN PRN   Administration





  Heparin   


 


Heparin Sodium (Porcine)  5,000 unit  01/12/18 19:18  01/13/18 00:27





  Heparin -  IVPUSH   5,000 unit





  PRN PRN   Administration





  Heparin   


 


Sodium Chloride  1,000 mls @ 42 mls/hr  01/11/18 23:45  01/15/18 06:29





  Normal Saline -  IV   42 mls/hr





  ASDIR JOSE L   Administration


 


HEPARIN SOD,PORK IN 0.45% NACL  25,000 units in 500 mls @ 20 mls/hr  01/12/18 21

:30  01/15/18 15:33





  Heparin-1/2ns 25,000 Units/500  IVPB   850 units/hr





  TITR JOSE L   17 mls/hr





  Protocol   Titration





  1,000 UNITS/HR   


 


Insulin Aspart  1 vial  01/13/18 16:30  01/15/18 12:00





  Novolog Vial Sliding Scale -  SQ   6 units





  ACHS JOSE L   Administration





  Protocol   


 


Prednisone  50 mg  01/15/18 19:00  





  Deltasone -  PO   





  TID@0200,0700,1900 [facility]   























Assessment/Plan





Extensive RLE DVT


RA/IVC thrombus


Likely PE


Acute Kidney Injury


Atrial Fibrillation


+Troponins


LV Systolic Dysfunction


Dementia





Was off xeralto sice 12/12 when she had a fall and was admitted to [facility] until 

day before afia. Has been off xeralto and developed extensive DVT/Rt. 

atrial  clot


Getting w/u for occult malignancy





agree with heparin drip

## 2018-01-15 NOTE — PN
Physical Exam: 


SUBJECTIVE: Patient seen and examined. She states she feels "mixed" and has RLE 

pain. 








OBJECTIVE:





 Vital Signs











 Period  Temp  Pulse  Resp  BP Sys/Ramsey  Pulse Ox


 


 Last 24 Hr  97.8 F-99.1 F  78-91  18-20  126-148/63-96  95-97








PE


Neuro: alert, awake, cn 2-12intact


Pulm: basilar rhonchi + wet cough mild tachypnea 


CV: s1 s2 irregular rhythm 


Abd: s nt nd + bs


ExT: RLE incision packed, CDI + tenderness 











 Laboratory Results - last 24 hr











  01/14/18 01/15/18 01/15/18





  21:07 06:07 06:30


 


WBC   


 


RBC   


 


Hgb   


 


Hct   


 


MCV   


 


MCH   


 


MCHC   


 


RDW   


 


Plt Count   


 


MPV   


 


Neutrophils %   


 


Lymphocytes %   


 


Monocytes %   


 


Eosinophils %   


 


Basophils %   


 


PTT (Actin FS)    45.0 H


 


Sodium   


 


Potassium   


 


Chloride   


 


Carbon Dioxide   


 


Anion Gap   


 


BUN   


 


Creatinine   


 


POC Glucometer  146  139 


 


Random Glucose   


 


Calcium   














  01/15/18 01/15/18 01/15/18





  06:30 06:30 11:52


 


WBC  15.4 H  


 


RBC  3.88  


 


Hgb  10.5 L  


 


Hct  33.8  


 


MCV  87.1  


 


MCH  27.1  


 


MCHC  31.1 L  


 


RDW  14.9  


 


Plt Count  229  


 


MPV  9.1  


 


Neutrophils %  72.3  


 


Lymphocytes %  19.5  


 


Monocytes %  7.5  


 


Eosinophils %  0.2  


 


Basophils %  0.5  


 


PTT (Actin FS)   


 


Sodium   142 


 


Potassium   4.6 


 


Chloride   110 H 


 


Carbon Dioxide   22 


 


Anion Gap   10 


 


BUN   16 


 


Creatinine   1.0 


 


POC Glucometer    247


 


Random Glucose   131 H 


 


Calcium   8.1 L 








Active Medications











Generic Name Dose Route Start Last Admin





  Trade Name Freq  PRN Reason Stop Dose Admin


 


Atenolol  50 mg  01/12/18 10:00  01/15/18 09:31





  Tenormin -  PO   50 mg





  DAILY JOSE L   Administration


 


Diphenhydramine HCl  50 mg  01/15/18 13:18  





  Benadryl -  PO  01/15/18 13:19  





  ONCE ONE   


 


Donepezil HCl  5 mg  01/12/18 22:00  01/14/18 21:03





  Aricept -  PO   5 mg





  HS JOSE L   Administration


 


Heparin Sodium (Porcine)  1,000 unit  01/12/18 19:18  01/15/18 09:31





  Heparin -  IVPUSH   1,000 unit





  PRN PRN   Administration





  Heparin   


 


Heparin Sodium (Porcine)  5,000 unit  01/12/18 19:18  01/13/18 00:27





  Heparin -  IVPUSH   5,000 unit





  PRN PRN   Administration





  Heparin   


 


Sodium Chloride  1,000 mls @ 42 mls/hr  01/11/18 23:45  01/15/18 06:29





  Normal Saline -  IV   42 mls/hr





  ASDIR JOSE L   Administration


 


HEPARIN SOD,PORK IN 0.45% NACL  25,000 units in 500 mls @ 20 mls/hr  01/12/18 21

:30  01/15/18 15:33





  Heparin-1/2ns 25,000 Units/500  IVPB   850 units/hr





  TITR JOSE L   17 mls/hr





  Protocol   Titration





  1,000 UNITS/HR   


 


Insulin Aspart  1 vial  01/13/18 16:30  01/15/18 17:19





  Novolog Vial Sliding Scale -  SQ   4 units





  ACHS JOSE L   Administration





  Protocol   


 


Prednisone  50 mg  01/15/18 19:00  





  Deltasone -  PO   





  TID@0200,0700,1900 [facility]   








Imaging: 


- ECHO 1/12/18: nl LV/EF. RV tds. dilated LA/RA. large complex echodensity in RA

, protrudes into IVC c/w thrombus. mod TR. RVSP 47.





Assessment: 85 year-old female with a PMH significant for HTN, Afib off a/c x 3 

weeks following a fall and injury to her right leg causing a hematoma which 

required surgical incision at [facility],systolic heart failure, carotid artery 

disease, venous stasis ulcers,and dementia, admitted with syncope and URI 

symptoms. Found to have bilateral DVTs and RA thrombus.





Plan: 


1. RA thrombus, ?PE 


- Maintain heparin gtt 


- Hematology to weight in re AC


- VQ scan ordered d/t iodine allergy and CTA


- Chest CTA will need to be discontinued, will d/w pulm


- IR consulted for possible thrombectomy 


- Rule out renal CA, Renal US ordered 


- Prednisone per Pulm 





2. Bilateral LE DVT


- Maintain heparin gtt





3. Syncope


- Possible PE induced vs RA mass 


- Negative for orthostatics, CD negative 


- No telemetry events





4. Leukocytosis 


- Likely due to DVT ? PE


- Infectious micro ruled out 





5. A fib


- Rate controlled


- Atenolol 50mg daily 


- Heparin gtt





6. Systolic heart failure


- Not in exacerbation 


- Hold lasix 





7. SHIVA


- Improved


- Continue gentle IVF 


- Hold lasix/ACE





8. Right lower extremity wound


- Continue xeroform and sterile gauze


- Surgery follow up as outpt 





9. Dementia


- Continue Donepezil





10. PPX


- Heparin gtt


- PT 





11. DM II 


- ISS, BGM ACHS 





Visit type





- Emergency Visit


Emergency Visit: Yes


ED Registration Date: 01/12/18


Care time: The patient presented to the Emergency Department on the above date 

and was hospitalized for further evaluation of their emergent condition.





- New Patient


This patient is new to me today: Yes


Date on this admission: 01/15/18





- Critical Care


Critical Care patient: No

## 2018-01-15 NOTE — PN
Progress Note, Physician


History of Present Illness: 





PULMONARY





AWAKE,COMFORTABLE,-CP,-SOB





- Current Medication List


Current Medications: 


Active Medications





Atenolol (Tenormin -)  50 mg PO DAILY JOSE L


   Last Admin: 01/15/18 09:31 Dose:  50 mg


Donepezil HCl (Aricept -)  5 mg PO HS JOSE L


   Last Admin: 01/14/18 21:03 Dose:  5 mg


Heparin Sodium (Porcine) (Heparin -)  1,000 unit IVPUSH PRN PRN


   PRN Reason: Heparin


   Last Admin: 01/15/18 09:31 Dose:  1,000 unit


Heparin Sodium (Porcine) (Heparin -)  5,000 unit IVPUSH PRN PRN


   PRN Reason: Heparin


   Last Admin: 01/13/18 00:27 Dose:  5,000 unit


Sodium Chloride (Normal Saline -)  1,000 mls @ 42 mls/hr IV ASDIR JOSE L


   Last Admin: 01/15/18 06:29 Dose:  42 mls/hr


HEPARIN SOD,PORK IN 0.45% NACL (Heparin-1/2ns 25,000 Units/500)  25,000 units 

in 500 mls @ 20 mls/hr IVPB TITR JOSE L; 1,000 UNITS/HR


   PRN Reason: Protocol


   Last Titration: 01/15/18 09:33 Dose:  850 units/hr, 17 mls/hr


Insulin Aspart (Novolog Vial Sliding Scale -)  1 vial SQ ACHS JOSE L


   PRN Reason: Protocol


   Last Admin: 01/15/18 06:17 Dose:  Not Given











- Objective


Vital Signs: 


 Vital Signs











Temperature  97.8 F   01/15/18 10:00


 


Pulse Rate  78   01/15/18 10:00


 


Respiratory Rate  18   01/15/18 10:00


 


Blood Pressure  127/72   01/15/18 10:00


 


O2 Sat by Pulse Oximetry (%)  97   01/15/18 10:00











Constitutional: Yes: Well Nourished, Calm


Eyes: Yes: WNL


HENT: Yes: WNL


Neck: Yes: WNL


Cardiovascular: Yes: Pulse Irregular, S1, S2


Respiratory: Yes: Diminished


Gastrointestinal: Yes: Normal Bowel Sounds, Soft


Extremities: Yes: WNL


Edema: Yes


Labs: 


 CBC, BMP





 01/15/18 06:30 





 01/15/18 06:30 





 INR, PTT











INR  1.33  (0.82-1.09)  H  01/11/18  15:30    














Assessment/Plan





Problem List





- Problems


(1) DVT (deep venous thrombosis)


Code(s): I82.409 - ACUTE EMBOLISM AND THOMBOS UNSP DEEP VN UNSP LOWER EXTREMITY

   





(2) Right atrial thrombus


Code(s): WSK1936 -    





(3) IVC thrombosis


Code(s): I82.220 - ACUTE EMBOLISM AND THROMBOSIS OF INFERIOR VENA CAVA   





(4) Acute kidney injury


Code(s): N17.9 - ACUTE KIDNEY FAILURE, UNSPECIFIED   





(5) Elevated troponin


Code(s): R74.8 - ABNORMAL LEVELS OF OTHER SERUM ENZYMES   





(6) Lactic acidosis


Code(s): E87.2 - ACIDOSIS   





(7) Atrial fibrillation


Code(s): I48.91 - UNSPECIFIED ATRIAL FIBRILLATION   





(8) CAD (coronary artery disease)


Code(s): I25.10 - ATHSCL HEART DISEASE OF NATIVE CORONARY ARTERY W/O ANG PCTRS 

  





(9) Systolic dysfunction, left ventricle


Code(s): I51.9 - HEART DISEASE, UNSPECIFIED   





(10) Dementia


Code(s): F03.90 - UNSPECIFIED DEMENTIA WITHOUT BEHAVIORAL DISTURBANCE   





Assessment/Plan





Extensive RLE DVT


RA/IVC thrombus


Likely PE


Acute Kidney Injury improved


Atrial Fibrillation


+Troponins


LV Systolic Dysfunction


Dementia





-   anticoagulation


-  echocardiogram


-  would consult IR to evaluate for catheter directed thrombectomy/thrombolysis


-  O2 to keep Spo2 >90%


-  IVF


-  monitor urine output, creatinine


- CHEST CTA AM








DR DOBBS

## 2018-01-15 NOTE — PN
Progress Note, Physician


Chief Complaint: 





syncope


History of Present Illness: 





no cp, sob, palpit, syncope





no cigs





- Current Medication List


Current Medications: 


Active Medications





Atenolol (Tenormin -)  50 mg PO DAILY [facility]


   Last Admin: 01/15/18 09:31 Dose:  50 mg


Donepezil HCl (Aricept -)  5 mg PO HS [facility]


   Last Admin: 01/14/18 21:03 Dose:  5 mg


Heparin Sodium (Porcine) (Heparin -)  1,000 unit IVPUSH PRN PRN


   PRN Reason: Heparin


   Last Admin: 01/15/18 09:31 Dose:  1,000 unit


Heparin Sodium (Porcine) (Heparin -)  5,000 unit IVPUSH PRN PRN


   PRN Reason: Heparin


   Last Admin: 01/13/18 00:27 Dose:  5,000 unit


Sodium Chloride (Normal Saline -)  1,000 mls @ 42 mls/hr IV ASDIR [facility]


   Last Admin: 01/15/18 06:29 Dose:  42 mls/hr


HEPARIN SOD,PORK IN 0.45% NACL (Heparin-1/2ns 25,000 Units/500)  25,000 units 

in 500 mls @ 20 mls/hr IVPB TITR JOSE L; 1,000 UNITS/HR


   PRN Reason: Protocol


   Last Titration: 01/15/18 09:33 Dose:  850 units/hr, 17 mls/hr


Insulin Aspart (Novolog Vial Sliding Scale -)  1 vial SQ ACHS JOSE L


   PRN Reason: Protocol


   Last Admin: 01/15/18 06:17 Dose:  Not Given











- Objective


Vital Signs: 


 Vital Signs











Temperature  98.3 F   01/15/18 06:00


 


Pulse Rate  86   01/15/18 06:00


 


Respiratory Rate  18   01/15/18 06:00


 


Blood Pressure  137/96   01/15/18 06:00


 


O2 Sat by Pulse Oximetry (%)  95   01/14/18 21:00











Constitutional: Yes: No Distress, Calm


Eyes: No: Sclera Icterus


HENT: No: Nasal Congestion


Cardiovascular: Yes: Regular Rate and Rhythm, S1, S2, Other (PMI non diplaced).

  No: Gallop, Murmur


Respiratory: Yes: CTA Bilaterally.  No: Accessory Muscle Use, Rales, Wheezes


Gastrointestinal: Yes: Normal Bowel Sounds, Soft.  No: Tenderness


Musculoskeletal: Yes: Other (No kyphosis)


Extremities: No: Cyanosis


Edema: No


Integumentary: No: Jaundice


Neurological: Yes: Alert, Oriented (x3)


Psychiatric: No: Agitated


Labs: 


 CBC, BMP





 01/15/18 06:30 





 01/15/18 06:30 





 INR, PTT











INR  1.33  (0.82-1.09)  H  01/11/18  15:30    














- ....Imaging


EKG: Other (tele: afib, good HR)





Assessment/Plan





Echo 1/12/18: nl LV/EF. RV tds. dilated LA/RA. large complex echodensity in RA, 

protrudes into IVC c/w thrombus. mod TR. RVSP 47.





LE duplex: bilat DVTs (extensive RLE)








85 year-old female with a PMH significant for HTN, atrial fibrillation off a/c 

x 3 weeks, ? systolic heart failure, carotid artery disease, venous stasis 

ulcers, and dementia. + upper respiratory symptoms, including a dry cough, x 10 

days. At PMD office on DOA she became cold and clammy and she had a brief 

episode of unresponsiveness. Her blood pressure was noted to be low. Returned 

to baseline mental status. In the ED, the patient was at her baseline mental 

status. Patient was taken off Xarelto three weeks ago following a fall and 

injury to her right leg causing a hematoma which required surgical incision at 

[facility].








extensive LE DVT, RA thrombus:


-probable thrombus in RA, extending from IVC. likely from extensive RLE thrombus

, in transit.


-DVT ? provoked, given recent fall with injury and surgical procedure (? 

duration of hospitalization/immobilization since)


-remains clinically stable.


-creat improved with fluids, GFR 40s now--CTA cancelled yest due to pt with iv 

contrast allergy. 


-defer ? whether V/Q will alter mgmt to pulm consultatns


-for now, continue UFH as doing, ? change to NOAC or lovenox.


-if she remains clinically stable, will rec repeat imaging after 2-4 weeks of AC

, expect thrombus to organize and/or break up


-rec malignancy w/u including for renal cell carcinoma (start with renal sono). 

rec hematology consult





syncope:


-brief LOC with hypotension on DOA at PMD office


-likely sec to PE or ? right sided cardiac output obstruction from RA mass


-transient sx's, hemodynamically stable since


-mgmt plan as above


-not orthostatic here





Afib:


-HR controlled, cont atenolol


-AC as above





HTN:


-bp controlled, same meds





SHIVA:


-likely vol depleted/prerenal


-numbers improving with IVF

## 2018-01-16 LAB
ALBUMIN SERPL-MCNC: 2.3 G/DL (ref 3.4–5)
ALP SERPL-CCNC: 82 U/L (ref 45–117)
ALT SERPL-CCNC: 11 U/L (ref 12–78)
ANION GAP SERPL CALC-SCNC: 10 MMOL/L (ref 8–16)
AST SERPL-CCNC: 13 U/L (ref 15–37)
BASOPHILS # BLD: 0.2 % (ref 0–2)
BILIRUB SERPL-MCNC: 0.9 MG/DL (ref 0.2–1)
BUN SERPL-MCNC: 19 MG/DL (ref 7–18)
CALCIUM SERPL-MCNC: 8.4 MG/DL (ref 8.5–10.1)
CHLORIDE SERPL-SCNC: 110 MMOL/L (ref 98–107)
CO2 SERPL-SCNC: 20 MMOL/L (ref 21–32)
CREAT SERPL-MCNC: 1 MG/DL (ref 0.55–1.02)
DEPRECATED RDW RBC AUTO: 15.3 % (ref 11.6–15.6)
EOSINOPHIL # BLD: 0 % (ref 0–4.5)
GLUCOSE SERPL-MCNC: 202 MG/DL (ref 74–106)
HCT VFR BLD CALC: 33.9 % (ref 32.4–45.2)
HGB BLD-MCNC: 10.5 GM/DL (ref 10.7–15.3)
LYMPHOCYTES # BLD: 8.9 % (ref 8–40)
MAGNESIUM SERPL-MCNC: 2.4 MG/DL (ref 1.8–2.4)
MCH RBC QN AUTO: 27.1 PG (ref 25.7–33.7)
MCHC RBC AUTO-ENTMCNC: 31 G/DL (ref 32–36)
MCV RBC: 87.4 FL (ref 80–96)
MONOCYTES # BLD AUTO: 1.2 % (ref 3.8–10.2)
NEUTROPHILS # BLD: 89.7 % (ref 42.8–82.8)
PHOSPHATE SERPL-MCNC: 4.1 MG/DL (ref 2.5–4.9)
PLATELET # BLD AUTO: 245 K/MM3 (ref 134–434)
PMV BLD: 9.5 FL (ref 7.5–11.1)
POTASSIUM SERPLBLD-SCNC: 5.1 MMOL/L (ref 3.5–5.1)
PROT SERPL-MCNC: 6.1 G/DL (ref 6.4–8.2)
RBC # BLD AUTO: 3.87 M/MM3 (ref 3.6–5.2)
SODIUM SERPL-SCNC: 140 MMOL/L (ref 136–145)
WBC # BLD AUTO: 14.1 K/MM3 (ref 4–10)

## 2018-01-16 RX ADMIN — HEPARIN SODIUM SCH MLS/HR: 5000 INJECTION, SOLUTION INTRAVENOUS at 22:57

## 2018-01-16 RX ADMIN — SODIUM CHLORIDE SCH: 9 INJECTION, SOLUTION INTRAVENOUS at 01:12

## 2018-01-16 RX ADMIN — INSULIN ASPART SCH UNITS: 100 INJECTION, SOLUTION INTRAVENOUS; SUBCUTANEOUS at 17:15

## 2018-01-16 RX ADMIN — ATENOLOL SCH MG: 50 TABLET ORAL at 11:27

## 2018-01-16 RX ADMIN — PREDNISONE SCH MG: 10 TABLET ORAL at 06:09

## 2018-01-16 RX ADMIN — HEPARIN SODIUM SCH MLS/HR: 5000 INJECTION, SOLUTION INTRAVENOUS at 09:10

## 2018-01-16 RX ADMIN — INSULIN ASPART SCH UNITS: 100 INJECTION, SOLUTION INTRAVENOUS; SUBCUTANEOUS at 22:57

## 2018-01-16 RX ADMIN — DONEPEZIL HYDROCHLORIDE SCH MG: 5 TABLET, FILM COATED ORAL at 22:56

## 2018-01-16 RX ADMIN — PREDNISONE SCH: 10 TABLET ORAL at 19:57

## 2018-01-16 RX ADMIN — INSULIN ASPART SCH UNITS: 100 INJECTION, SOLUTION INTRAVENOUS; SUBCUTANEOUS at 12:46

## 2018-01-16 RX ADMIN — INSULIN ASPART SCH UNITS: 100 INJECTION, SOLUTION INTRAVENOUS; SUBCUTANEOUS at 06:10

## 2018-01-16 RX ADMIN — SODIUM CHLORIDE SCH: 9 INJECTION, SOLUTION INTRAVENOUS at 23:20

## 2018-01-16 RX ADMIN — PREDNISONE SCH MG: 10 TABLET ORAL at 01:12

## 2018-01-16 RX ADMIN — SODIUM CHLORIDE SCH MLS/HR: 9 INJECTION, SOLUTION INTRAVENOUS at 09:00

## 2018-01-16 NOTE — PN
Physical Exam: 


SUBJECTIVE: Patient seen and examined. Pt says she feels fair. She has less 

cough per bedside Aid. 








OBJECTIVE:





 Vital Signs











 Period  Temp  Pulse  Resp  BP Sys/Ramsey  Pulse Ox


 


 Last 24 Hr  97.1 F-98.9 F  75-96  20-20  108-142/60-94  97-97











PE


Neuro: alert, awake, cn 2-12intact


Pulm: basilar rhonchi + wet cough 


CV: s1 s2 irregular rhythm 


Abd: s nt nd + bs


ExT: RLE incision packed, CDI + tenderness 


 Laboratory Results - last 24 hr











  01/16/18 01/16/18 01/16/18





  05:30 05:35 05:35


 


WBC    14.1 H


 


RBC    3.87


 


Hgb    10.5 L


 


Hct    33.9


 


MCV    87.4


 


MCH    27.1


 


MCHC    31.0 L


 


RDW    15.3


 


Plt Count    245


 


MPV    9.5


 


Neutrophils %    89.7 H D


 


Lymphocytes %    8.9  D


 


Monocytes %    1.2 L D


 


Eosinophils %    0.0  D


 


Basophils %    0.2


 


PTT (Actin FS)   54.6 H 


 


Sodium   


 


Potassium   


 


Chloride   


 


Carbon Dioxide   


 


Anion Gap   


 


BUN   


 


Creatinine   


 


Creat Clearance w eGFR   


 


POC Glucometer  227  


 


Random Glucose   


 


Hemoglobin A1c %   


 


Calcium   


 


Phosphorus   


 


Magnesium   


 


Total Bilirubin   


 


AST   


 


ALT   


 


Alkaline Phosphatase   


 


Total Protein   


 


Albumin   














  01/16/18 01/16/18 01/16/18





  05:35 05:35 11:57


 


WBC   


 


RBC   


 


Hgb   


 


Hct   


 


MCV   


 


MCH   


 


MCHC   


 


RDW   


 


Plt Count   


 


MPV   


 


Neutrophils %   


 


Lymphocytes %   


 


Monocytes %   


 


Eosinophils %   


 


Basophils %   


 


PTT (Actin FS)   


 


Sodium  140  


 


Potassium  5.1  


 


Chloride  110 H  


 


Carbon Dioxide  20 L  


 


Anion Gap  10  


 


BUN  19 H  


 


Creatinine  1.0  


 


Creat Clearance w eGFR  52.69  


 


POC Glucometer    329


 


Random Glucose  202 H  


 


Hemoglobin A1c %   6.9 H D 


 


Calcium  8.4 L  


 


Phosphorus  4.1  


 


Magnesium  2.4  


 


Total Bilirubin  0.9  


 


AST  13 L  


 


ALT  11 L  


 


Alkaline Phosphatase  82  


 


Total Protein  6.1 L  


 


Albumin  2.3 L  








Active Medications











Generic Name Dose Route Start Last Admin





  Trade Name Freq  PRN Reason Stop Dose Admin


 


Atenolol  50 mg  01/12/18 10:00  01/16/18 11:27





  Tenormin -  PO   50 mg





  DAILY JOSE L   Administration


 


Donepezil HCl  5 mg  01/12/18 22:00  01/15/18 21:05





  Aricept -  PO   5 mg





  HS JOSE L   Administration


 


Heparin Sodium (Porcine)  1,000 unit  01/12/18 19:18  01/15/18 09:31





  Heparin -  IVPUSH   1,000 unit





  PRN PRN   Administration





  Heparin   


 


Heparin Sodium (Porcine)  5,000 unit  01/12/18 19:18  01/13/18 00:27





  Heparin -  IVPUSH   5,000 unit





  PRN PRN   Administration





  Heparin   


 


Sodium Chloride  1,000 mls @ 42 mls/hr  01/11/18 23:45  01/16/18 01:12





  Normal Saline -  IV   Not Given





  ASDIR JOSE L   


 


HEPARIN SOD,PORK IN 0.45% NACL  25,000 units in 500 mls @ 20 mls/hr  01/12/18 21

:30  01/16/18 09:10





  Heparin-1/2ns 25,000 Units/500  IVPB   850 units/hr





  TITR JOSE L   17 mls/hr





  Protocol   Administration





  1,000 UNITS/HR   


 


Insulin Aspart  1 vial  01/13/18 16:30  01/16/18 12:46





  Novolog Vial Sliding Scale -  SQ   8 units





  ACHS JOSE L   Administration





  Protocol   


 


Prednisone  50 mg  01/15/18 19:00  01/16/18 06:09





  Deltasone -  PO   50 mg





  TID@0200,0700,1900 JOSE L   Administration








Imaging: 


- ECHO 1/12/18: nl LV/EF. RV tds. dilated LA/RA. large complex echodensity in RA

, protrudes into IVC c/w thrombus. mod TR. RVSP 47.





Assessment: 85 year-old female with a PMH significant for HTN, Afib off a/c x 3 

weeks following a fall and injury to her right leg causing a hematoma which 

required surgical incision at [facility],systolic heart failure, carotid artery 

disease, venous stasis ulcers,and dementia, admitted with syncope and URI 

symptoms. Found to have bilateral DVTs and RA thrombus.





Plan: 


1. RA thrombus, PE 


- CTA shows PE along with RA thrombus


- Discussed with IR, needs IVC, however would like cardiology input as pt with 

high risk for mortality along with exclusion factors unknown for thrombolysis 


- Maintain heparin gtt 





2. Chest CTA


- Pulmonary nodule?


- Obtain CT chest w contrast in 48 hrs 


- Renal ca work up underway 


- Heme following 





2. Bilateral LE DVT


- Maintain heparin gtt





3. Syncope


- Possible PE induced vs RA mass 


- Negative for orthostatics, CD negative 


- No telemetry events





4. Leukocytosis 


- Likely due to DVT/PE 





5. A fib


- Rate controlled


- Atenolol 50mg daily 


- Heparin gtt





6. Systolic heart failure


- Not in exacerbation 


- Hold lasix 





7. SHIVA


- Improved


- Continue gentle IVF 


- Hold lasix/ACE





8. Right lower extremity wound


- Continue xeroform and sterile gauze


- Surgery follow up as outpt 





9. Dementia


- Continue Donepezil





10. PPX


- Heparin gtt


- PT 





11. DM II 


- ISS, BGM ACHS 





Visit type





- Emergency Visit


Emergency Visit: Yes


ED Registration Date: 01/12/18


Care time: The patient presented to the Emergency Department on the above date 

and was hospitalized for further evaluation of their emergent condition.





- New Patient


This patient is new to me today: No





- Critical Care


Critical Care patient: No

## 2018-01-16 NOTE — PN
Progress Note (short form)





- Note


Progress Note: 


Pt seen and examined.





ROS unobtainable due to pts condition. 





O/E:


General: NAD,alert and awake


Eyes: Yes: Conjunctiva Clear, EOM Intact


HENT: Yes: Atraumatic, Normocephalic


Neck: Yes: Supple, Trachea Midline


Cardiovascular: Yes: Regular Rate and Rhythm


Respiratory: Yes: Diminished (decreased breath sounds at the bases)


Gastrointestinal: Yes: Normal Bowel Sounds, Soft.  No: Tenderness


Edema: Yes





 Last Vital Signs











Temp Pulse Resp BP Pulse Ox


 


 98 F   83   20   108/70   97 


 


 01/16/18 09:01  01/16/18 09:01  01/16/18 09:01  01/16/18 09:01  01/16/18 09:00








 CBC, BMP





 01/16/18 05:35 





 01/16/18 05:35 





 Current Medications











Generic Name Dose Route Start Last Admin





  Trade Name Freq  PRN Reason Stop Dose Admin


 


Atenolol  50 mg  01/12/18 10:00  01/15/18 09:31





  Tenormin -  PO   50 mg





  DAILY JOSE L   Administration


 


Donepezil HCl  5 mg  01/12/18 22:00  01/15/18 21:05





  Aricept -  PO   5 mg





  HS JOSE L   Administration


 


Heparin Sodium (Porcine)  1,000 unit  01/12/18 19:18  01/15/18 09:31





  Heparin -  IVPUSH   1,000 unit





  PRN PRN   Administration





  Heparin   


 


Heparin Sodium (Porcine)  5,000 unit  01/12/18 19:18  01/13/18 00:27





  Heparin -  IVPUSH   5,000 unit





  PRN PRN   Administration





  Heparin   


 


Sodium Chloride  1,000 mls @ 42 mls/hr  01/11/18 23:45  01/16/18 01:12





  Normal Saline -  IV   Not Given





  ASDIR JOSE L   


 


HEPARIN SOD,PORK IN 0.45% NACL  25,000 units in 500 mls @ 20 mls/hr  01/12/18 21

:30  01/16/18 09:10





  Heparin-1/2ns 25,000 Units/500  IVPB   850 units/hr





  TITR JOSE L   17 mls/hr





  Protocol   Administration





  1,000 UNITS/HR   


 


Insulin Aspart  1 vial  01/13/18 16:30  01/16/18 06:10





  Novolog Vial Sliding Scale -  SQ   4 units





  ACHS JOSE L   Administration





  Protocol   


 


Prednisone  50 mg  01/15/18 19:00  01/16/18 06:09





  Deltasone -  PO   50 mg





  TID@0200,0700,1900 JOSE L   Administration








RLE,LLE DVT


RA thrombus


extensive PE


RUL mass suspicious for malignancy


Atrial Fibrillation


+Troponins


LV Systolic Dysfunction


?Renal mass


Dementia





Was off xeralto sice 12/12 when she had a fall and was admitted to [facility] until 

day before Tashia. Has been off xeralto and developed extensive DVT/Rt. 

atrial  clot





likely from an underlying malignancy causing a hypercoagulable state.





?Lung mass suspicious for neoplastic process has a ?renal mass. 





would recommend CT a/p with contrast ( monitor Cr ) likely in next 48 hrs , 

with pre-hydration and pre meds. 





agree with heparin drip for now, will need to consider LMWH therapeutic dose 

for long term.





Pulm c/s noted, ?consideration for thrombolysis





for labs





will follow

## 2018-01-16 NOTE — PN
Progress Note, Physician


History of Present Illness: 





pulmonary





alert,comfortable,-resp distress. chest cta large jones pulmonary emboli,rul mass





- Current Medication List


Current Medications: 


Active Medications





Atenolol (Tenormin -)  50 mg PO DAILY Pending sale to [facility]


   Last Admin: 01/16/18 11:27 Dose:  50 mg


Donepezil HCl (Aricept -)  5 mg PO HS Pending sale to [facility]


   Last Admin: 01/15/18 21:05 Dose:  5 mg


Heparin Sodium (Porcine) (Heparin -)  1,000 unit IVPUSH PRN PRN


   PRN Reason: Heparin


   Last Admin: 01/15/18 09:31 Dose:  1,000 unit


Heparin Sodium (Porcine) (Heparin -)  5,000 unit IVPUSH PRN PRN


   PRN Reason: Heparin


   Last Admin: 01/13/18 00:27 Dose:  5,000 unit


Sodium Chloride (Normal Saline -)  1,000 mls @ 42 mls/hr IV ASDIR Pending sale to [facility]


   Last Admin: 01/16/18 01:12 Dose:  Not Given


HEPARIN SOD,PORK IN 0.45% NACL (Heparin-1/2ns 25,000 Units/500)  25,000 units 

in 500 mls @ 20 mls/hr IVPB TITR JOSE L; 1,000 UNITS/HR


   PRN Reason: Protocol


   Last Admin: 01/16/18 09:10 Dose:  850 units/hr, 17 mls/hr


Insulin Aspart (Novolog Vial Sliding Scale -)  1 vial SQ ACHS Pending sale to [facility]


   PRN Reason: Protocol


   Last Admin: 01/16/18 06:10 Dose:  4 units


Prednisone (Deltasone -)  50 mg PO TID@0200,0700,1900 Pending sale to [facility]


   Last Admin: 01/16/18 06:09 Dose:  50 mg











- Objective


Vital Signs: 


 Vital Signs











Temperature  98 F   01/16/18 09:01


 


Pulse Rate  83   01/16/18 09:01


 


Respiratory Rate  20   01/16/18 09:01


 


Blood Pressure  108/70   01/16/18 09:01


 


O2 Sat by Pulse Oximetry (%)  97   01/16/18 09:00











Constitutional: Yes: Well Nourished, Calm


Eyes: Yes: WNL


HENT: Yes: WNL


Neck: Yes: WNL


Cardiovascular: Yes: Pulse Irregular, S1, S2


Respiratory: Yes: Diminished


Gastrointestinal: Yes: Normal Bowel Sounds, Soft


Extremities: Yes: WNL, Erythema


Edema: Yes


Labs: 


 CBC, BMP





 01/16/18 05:35 





 01/16/18 05:35 





 INR, PTT











INR  1.33  (0.82-1.09)  H  01/11/18  15:30    














- ....Imaging


Cat Scan: Report Reviewed, Image Reviewed (LARGE JONES PUMONARY EMBOLI,RUL MASS)





Assessment/Plan





Problem List





- Problems


(1) DVT (deep venous thrombosis)


Code(s): I82.409 - ACUTE EMBOLISM AND THOMBOS UNSP DEEP VN UNSP LOWER EXTREMITY

   





(2) Right atrial thrombus


Code(s): [id number] -    





(3) IVC thrombosis


Code(s): I82.220 - ACUTE EMBOLISM AND THROMBOSIS OF INFERIOR VENA CAVA   





(4) Acute kidney injury


Code(s): N17.9 - ACUTE KIDNEY FAILURE, UNSPECIFIED   





(5) Elevated troponin


Code(s): R74.8 - ABNORMAL LEVELS OF OTHER SERUM ENZYMES   





(6) Lactic acidosis


Code(s): E87.2 - ACIDOSIS   





(7) Atrial fibrillation


Code(s): I48.91 - UNSPECIFIED ATRIAL FIBRILLATION   





(8) CAD (coronary artery disease)


Code(s): I25.10 - ATHSCL HEART DISEASE OF NATIVE CORONARY ARTERY W/O ANG PCTRS 

  





(9) Systolic dysfunction, left ventricle


Code(s): I51.9 - HEART DISEASE, UNSPECIFIED   





(10) Dementia


Code(s): F03.90 - UNSPECIFIED DEMENTIA WITHOUT BEHAVIORAL DISTURBANCE   





Assessment/Plan





Extensive RLE DVT,,LLE


Large Bilateral pulmonary emboli main pulmonary arteries


RA/IVC thrombus


Likely PE


Acute Kidney Injury improved


Atrial Fibrillation


+Troponins


LV Systolic Dysfunction


Dementia


RUL Nodule/mass





-   anticoagulation


-  echocardiogram


-  would consult IR to evaluate for catheter directed thrombectomy/thrombolysis


-  O2 to keep Spo2 >90%


-  IVF


-  monitor urine output, creatinine





DR DOBBS

## 2018-01-16 NOTE — PN
Progress Note (short form)





- Note


Progress Note: 





Chief Complaint: 





syncope


History of Present Illness: 





On IVF. hgb stable on heparin drip.  PE protocol CT images and report reviewed.

  Extensive bilateral PE.  


no cp, sob, palpit, syncope





no cigs





 Current Medications





Atenolol (Tenormin -)  50 mg PO DAILY [facility]


   Last Admin: 01/16/18 11:27 Dose:  50 mg


Donepezil HCl (Aricept -)  5 mg PO HS [facility]


   Last Admin: 01/15/18 21:05 Dose:  5 mg


Heparin Sodium (Porcine) (Heparin -)  1,000 unit IVPUSH PRN PRN


   PRN Reason: Heparin


   Last Admin: 01/15/18 09:31 Dose:  1,000 unit


Heparin Sodium (Porcine) (Heparin -)  5,000 unit IVPUSH PRN PRN


   PRN Reason: Heparin


   Last Admin: 01/13/18 00:27 Dose:  5,000 unit


Sodium Chloride (Normal Saline -)  1,000 mls @ 42 mls/hr IV ASDIR [facility]


   Last Admin: 01/16/18 01:12 Dose:  Not Given


HEPARIN SOD,PORK IN 0.45% NACL (Heparin-1/2ns 25,000 Units/500)  25,000 units 

in 500 mls @ 20 mls/hr IVPB TITR JOSE L; 1,000 UNITS/HR


   PRN Reason: Protocol


   Last Admin: 01/16/18 09:10 Dose:  850 units/hr, 17 mls/hr


Insulin Aspart (Novolog Vial Sliding Scale -)  1 vial SQ ACHS JOSE L


   PRN Reason: Protocol


   Last Admin: 01/16/18 06:10 Dose:  4 units


Prednisone (Deltasone -)  50 mg PO TID@0200,0700,1900 [facility]


   Last Admin: 01/16/18 06:09 Dose:  50 mg














- Objective


Vital Signs: 


 





 Vital Signs - 24 hr











  01/15/18 01/15/18 01/15/18





  14:12 18:00 21:00


 


Temperature 97.9 F 97.7 F 


 


Pulse Rate 87 92 H 


 


Respiratory 20 20 20





Rate   


 


Blood Pressure 130/63 142/78 


 


O2 Sat by Pulse   97





Oximetry (%)   














  01/15/18 01/16/18 01/16/18





  22:00 02:00 06:00


 


Temperature 98.9 F 97.6 F 97.4 F L


 


Pulse Rate 75 83 96 H


 


Respiratory 20 20 20





Rate   


 


Blood Pressure 139/86 140/82 138/94


 


O2 Sat by Pulse 97  





Oximetry (%)   














  01/16/18 01/16/18





  09:00 09:01


 


Temperature  98 F


 


Pulse Rate  83


 


Respiratory 20 20





Rate  


 


Blood Pressure  108/70


 


O2 Sat by Pulse 97 





Oximetry (%)  














 Intake & Output











 01/14/18 01/15/18 01/16/18 01/17/18





 07:59 07:59 07:59 07:59


 


Intake Total 909 1608 708 


 


Balance 909 1608 708 

















Constitutional: Yes: No Distress, Calm


Eyes: No: Sclera Icterus


HENT: No: Nasal Congestion


Cardiovascular: Yes: Regular Rate and Rhythm, S1, S2, Other (PMI non diplaced).

  No: Gallop, Murmur


Respiratory: Yes: CTA Bilaterally.  No: Accessory Muscle Use, Rales, Wheezes


Gastrointestinal: Yes: Normal Bowel Sounds, Soft.  No: Tenderness


Musculoskeletal: Yes: Other (No kyphosis)


Extremities: No: Cyanosis


Edema: No


Integumentary: No: Jaundice


Neurological: Yes: Alert, Oriented (x3)


Psychiatric: No: Agitated


Labs: 


 





 CBC, BMP





 01/16/18 05:35 





 01/16/18 05:35 





 Laboratory Tests











  01/16/18 01/16/18





  05:35 05:35


 


Hemoglobin A1c %   6.9 H D


 


Magnesium  2.4 


 


Total Bilirubin  0.9 


 


AST  13 L 


 


Alkaline Phosphatase  82 


 


Albumin  2.3 L 














- ....Imaging


EKG: Other (tele: afib, good HR)





Assessment/Plan





Echo 1/12/18: nl LV/EF. RV tds. dilated LA/RA. large complex echodensity in RA, 

protrudes into IVC c/w thrombus. mod TR. RVSP 47.





CTA:  extensive bilateral PE.  1 cm RUL mass suspicious for malignancy.  Small 

bilateral pleural effusions and basilar atelectasis R>L.  





LE duplex: bilat DVTs (extensive RLE)








85 year-old female with a PMH significant for HTN, atrial fibrillation off a/c 

x 3 weeks, ? systolic heart failure, carotid artery disease, venous stasis 

ulcers, and dementia. + upper respiratory symptoms, including a dry cough, x 10 

days. At PMD office on DOA she became cold and clammy and she had a brief 

episode of unresponsiveness. Her blood pressure was noted to be low. Returned 

to baseline mental status. In the ED, the patient was at her baseline mental 

status. Patient was taken off Xarelto three weeks ago following a fall and 

injury to her right leg causing a hematoma which required surgical incision at 

[facility].








extensive LE DVT, RA thrombus:


-probable thrombus in RA, extending from IVC. likely from extensive RLE thrombus

, in transit in setting of both extensive bilateral DVT (? provoked, given 

recent fall with injury and surgical procedure)  and extensive bilateral PE 

with concern for RUL malignancy.  


- RV not well seen on echo but remains hemodynamically stable, oxygenating 

well.  Tolerating AC.  No strong indication for either ivc placement or 

thrombolysis at this time.  However, given intracardiac thrombus and extensive 

clot burden, she is at risk for clincial deterioration.  can consider need for 

thrombolysis if she decompensates clinically.  Would confirm no brain mets or 

recent cva since those clinical scenarios would be contraindications to 

thrombolytic thearpy.  


-for now, continue UFH as doing, ? change to NOAC or lovenox now that 

creatinine has improved, will defer to hematology.  .


-if she remains clinically stable, will rec repeat imaging after 2-4 weeks of AC

, expect thrombus to organize and/or break up


- malignancy w/u per  hematology consult





syncope:


-brief LOC with hypotension on DOA at PMD office


-likely sec to PE or ? right sided cardiac output obstruction from RA mass


-transient sx's, hemodynamically stable since


-mgmt plan as above


-not orthostatic here





Afib:


-HR controlled, cont atenolol


-AC as above





HTN:


-bp controlled, same meds





SHIVA:


-likely vol depleted/prerenal


-numbers improved s/p  IVF

## 2018-01-17 LAB
ANION GAP SERPL CALC-SCNC: 8 MMOL/L (ref 8–16)
BASOPHILS # BLD: 0.1 % (ref 0–2)
BILIRUB DIRECT SERPL-MCNC: 225 U/L (ref 84–246)
BUN SERPL-MCNC: 28 MG/DL (ref 7–18)
CALCIUM SERPL-MCNC: 8.5 MG/DL (ref 8.5–10.1)
CHLORIDE SERPL-SCNC: 108 MMOL/L (ref 98–107)
CO2 SERPL-SCNC: 25 MMOL/L (ref 21–32)
CREAT SERPL-MCNC: 1.1 MG/DL (ref 0.55–1.02)
DEPRECATED RDW RBC AUTO: 15.2 % (ref 11.6–15.6)
EOSINOPHIL # BLD: 0 % (ref 0–4.5)
GLUCOSE SERPL-MCNC: 142 MG/DL (ref 74–106)
HCT VFR BLD CALC: 30.8 % (ref 32.4–45.2)
HGB BLD-MCNC: 9.6 GM/DL (ref 10.7–15.3)
LYMPHOCYTES # BLD: 11.2 % (ref 8–40)
MCH RBC QN AUTO: 26.8 PG (ref 25.7–33.7)
MCHC RBC AUTO-ENTMCNC: 31 G/DL (ref 32–36)
MCV RBC: 86.4 FL (ref 80–96)
MONOCYTES # BLD AUTO: 5.1 % (ref 3.8–10.2)
NEUTROPHILS # BLD: 83.6 % (ref 42.8–82.8)
PLATELET # BLD AUTO: 243 K/MM3 (ref 134–434)
PMV BLD: 8.7 FL (ref 7.5–11.1)
POTASSIUM SERPLBLD-SCNC: 4.5 MMOL/L (ref 3.5–5.1)
RBC # BLD AUTO: 3.56 M/MM3 (ref 3.6–5.2)
SODIUM SERPL-SCNC: 141 MMOL/L (ref 136–145)
WBC # BLD AUTO: 20.9 K/MM3 (ref 4–10)

## 2018-01-17 RX ADMIN — INSULIN ASPART SCH UNITS: 100 INJECTION, SOLUTION INTRAVENOUS; SUBCUTANEOUS at 06:07

## 2018-01-17 RX ADMIN — ATENOLOL SCH MG: 50 TABLET ORAL at 09:25

## 2018-01-17 RX ADMIN — ENOXAPARIN SODIUM SCH MG: 80 INJECTION SUBCUTANEOUS at 22:29

## 2018-01-17 RX ADMIN — INSULIN ASPART SCH UNITS: 100 INJECTION, SOLUTION INTRAVENOUS; SUBCUTANEOUS at 17:07

## 2018-01-17 RX ADMIN — HEPARIN SODIUM PRN UNIT: 5000 INJECTION, SOLUTION INTRAVENOUS; SUBCUTANEOUS at 09:25

## 2018-01-17 RX ADMIN — INSULIN ASPART SCH UNITS: 100 INJECTION, SOLUTION INTRAVENOUS; SUBCUTANEOUS at 12:08

## 2018-01-17 RX ADMIN — HEPARIN SODIUM SCH MLS/HR: 5000 INJECTION, SOLUTION INTRAVENOUS at 09:25

## 2018-01-17 RX ADMIN — INSULIN ASPART SCH UNITS: 100 INJECTION, SOLUTION INTRAVENOUS; SUBCUTANEOUS at 22:30

## 2018-01-17 RX ADMIN — DONEPEZIL HYDROCHLORIDE SCH MG: 5 TABLET, FILM COATED ORAL at 22:29

## 2018-01-17 NOTE — PN
Progress Note (short form)





- Note


Progress Note: 





Progress Note (short form)





- Note


Progress Note: 





Chief Complaint: 





syncope


History of Present Illness: 





On IVF. hgb stable on heparin drip. Extensive bilateral PE.  


no cp, sob, palpit, syncope





no cigs





 


 Current Medications











Generic Name Dose Route Start Last Admin





  Trade Name Freq  PRN Reason Stop Dose Admin


 


Atenolol  50 mg  01/12/18 10:00  01/17/18 09:25





  Tenormin -  PO   50 mg





  DAILY JOSE L   Administration


 


Donepezil HCl  5 mg  01/12/18 22:00  01/16/18 22:56





  Aricept -  PO   5 mg





  HS JOSE L   Administration


 


Heparin Sodium (Porcine)  1,000 unit  01/12/18 19:18  01/15/18 09:31





  Heparin -  IVPUSH   1,000 unit





  PRN PRN   Administration





  Heparin   


 


Heparin Sodium (Porcine)  5,000 unit  01/12/18 19:18  01/13/18 00:27





  Heparin -  IVPUSH   5,000 unit





  PRN PRN   Administration





  Heparin   


 


Sodium Chloride  1,000 mls @ 42 mls/hr  01/11/18 23:45  01/16/18 23:20





  Normal Saline -  IV   Not Given





  ASDIR JOSE L   


 


HEPARIN SOD,PORK IN 0.45% NACL  25,000 units in 500 mls @ 20 mls/hr  01/12/18 21

:30  01/17/18 09:25





  Heparin-1/2ns 25,000 Units/500  IVPB   1,000 units/hr





  TITR JOSE L   20 mls/hr





  Protocol   Administration





  1,000 UNITS/HR   


 


Insulin Aspart  1 vial  01/13/18 16:30  01/17/18 06:07





  Novolog Vial Sliding Scale -  SQ   2 units





  ACHS JOSE L   Administration





  Protocol   























- Objective


Vital Signs: 


 


 Vital Signs











Temp  98 F   01/17/18 09:30


 


Pulse  80   01/17/18 09:30


 


Resp  20   01/17/18 09:30


 


BP  130/66   01/17/18 09:30


 


Pulse Ox  95   01/17/18 07:47








 Intake & Output











 01/16/18 01/16/18 01/17/18





 11:59 23:59 11:59


 


Intake Total 413 1003 


 


Balance 413 1003 


 


Intake:   


 


   1003 


 


    HEPARIN-1/2NS 25,000 119 289 





    UNITS/500 25,000 units In   





    500 ml @ 1,000 UNITS/HR   





    20 mls/hr IVPB TITR JOSE L   





    Rx#:[id number]   


 


    Normal Saline - 1,000 ml 294 714 





    @ 42 mls/hr IV ASDIR JOSE L   





    Rx#:[id number]   


 


Other:   


 


  Voiding Method Diaper Diaper Diaper


 


  # Unmeasured Voids   


 


    Straight Cath 2 1 


 


    Void  2 2


 


  Bowel Movement  Yes 


 


  # Bowel Movements  1 














Constitutional: Yes: No Distress, Calm


Eyes: No: Sclera Icterus


HENT: No: Nasal Congestion


Cardiovascular: Yes: Regular Rate and Rhythm, S1, S2, Other (PMI non diplaced).

  No: Gallop, Murmur


Respiratory: Yes: CTA Bilaterally.  No: Accessory Muscle Use, Rales, Wheezes


Gastrointestinal: Yes: Normal Bowel Sounds, Soft.  No: Tenderness


Musculoskeletal: Yes: Other (No kyphosis)


Extremities: No: Cyanosis


Edema: No


Integumentary: No: Jaundice


Neurological: Yes: Alert, Oriented (x3)


Psychiatric: No: Agitated


Labs: 


 





 


 Laboratory Last Values











WBC  20.9 K/mm3 (4.0-10.0)  H D 01/17/18  06:50    


 


RBC  3.56 M/mm3 (3.60-5.2)  L  01/17/18  06:50    


 


Hgb  9.6 GM/dL (10.7-15.3)  L  01/17/18  06:50    


 


Hct  30.8 % (32.4-45.2)  L  01/17/18  06:50    


 


MCV  86.4 fl (80-96)   01/17/18  06:50    


 


MCH  26.8 pg (25.7-33.7)   01/17/18  06:50    


 


MCHC  31.0 g/dl (32.0-36.0)  L  01/17/18  06:50    


 


RDW  15.2 % (11.6-15.6)   01/17/18  06:50    


 


Plt Count  243 K/MM3 (134-434)   01/17/18  06:50    


 


MPV  8.7 fl (7.5-11.1)   01/17/18  06:50    


 


Neutrophils %  83.6 % (42.8-82.8)  H  01/17/18  06:50    


 


Neutrophils % (Manual)  74.0 % (42.8-82.8)   01/13/18  08:52    


 


Band Neutrophils %  4.0 % (0-10)   01/13/18  08:52    


 


Lymphocytes %  11.2 % (8-40)  D 01/17/18  06:50    


 


Lymphocytes % (Manual)  18.0 % (8-40)   01/13/18  08:52    


 


Monocytes %  5.1 % (3.8-10.2)  D 01/17/18  06:50    


 


Monocytes % (Manual)  4 % (3.8-10.2)   01/13/18  08:52    


 


Eosinophils %  0.0 % (0-4.5)   01/17/18  06:50    


 


Basophils %  0.1 % (0-2.0)   01/17/18  06:50    


 


Platelet Estimate  Adequate   01/13/18  08:52    


 


Retic Count  3.37 % (0.5-1.5)  H  01/17/18  06:50    


 


PT with INR  14.8 SEC (10.2-13.0)  H  01/11/18  15:30    


 


INR  1.33  (0.82-1.09)  H  01/11/18  15:30    


 


PTT (Actin FS)  37.0 SECONDS (26.9-34.4)  H D 01/17/18  06:50    


 


Sodium  141 mmol/L (136-145)   01/17/18  06:50    


 


Potassium  4.5 mmol/L (3.5-5.1)   01/17/18  06:50    


 


Chloride  108 mmol/L ()  H  01/17/18  06:50    


 


Carbon Dioxide  25 mmol/L (21-32)  D 01/17/18  06:50    


 


Anion Gap  8  (8-16)   01/17/18  06:50    


 


BUN  28 mg/dL (7-18)  H  01/17/18  06:50    


 


Creatinine  1.1 mg/dL (0.55-1.02)  H  01/17/18  06:50    


 


Creat Clearance w eGFR  52.69  (>60)   01/16/18  05:35    


 


POC Glucometer  181 UNITS ()   01/17/18  05:34    


 


Random Glucose  142 mg/dL ()  H  01/17/18  06:50    


 


Hemoglobin A1c %  6.9 % (4.8-6.0)  H D 01/16/18  05:35    


 


Lactic Acid  1.2 mmol/L (0.4-2.0)   01/12/18  07:00    


 


Calcium  8.5 mg/dL (8.5-10.1)   01/17/18  06:50    


 


Phosphorus  4.1 mg/dL (2.5-4.9)   01/16/18  05:35    


 


Magnesium  2.4 mg/dL (1.8-2.4)   01/16/18  05:35    


 


Ferritin  74.820 ng/ml (6.9-282.5)   01/17/18  06:50    


 


Total Bilirubin  0.9 mg/dL (0.2-1.0)   01/16/18  05:35    


 


AST  13 U/L (15-37)  L  01/16/18  05:35    


 


ALT  11 U/L (12-78)  L  01/16/18  05:35    


 


Alkaline Phosphatase  82 U/L ()   01/16/18  05:35    


 


LD Total  225 U/L ()   01/17/18  06:50    


 


Creatine Kinase  28 IU/L ()   01/12/18  15:00    


 


Troponin I  0.03 ng/ml (0.00-0.05)   01/14/18  07:40    


 


Total Protein  6.1 g/dl (6.4-8.2)  L  01/16/18  05:35    


 


Albumin  2.3 g/dl (3.4-5.0)  L  01/16/18  05:35    


 


Triglycerides  121 mg/dl ()  D 01/12/18  11:30    


 


Cholesterol  119 mg/dl  01/12/18  11:30    


 


Total LDL Cholesterol  73 mg/dl  01/12/18  11:30    


 


HDL Cholesterol  22 mg/dl (29-89)  L D 01/12/18  11:30    


 


Vitamin B12  1678 pg/ml (180-914)  H  01/17/18  06:50    


 


Urine Color  Yellow   01/11/18  17:37    


 


Urine Appearance  Clear   01/11/18  17:37    


 


Urine pH  6.0  (4.5-8)   01/11/18  17:37    


 


Ur Specific Gravity  1.015  (1.005-1.025)   01/11/18  17:37    


 


Urine Protein  Trace  (NEGATIVE)   01/11/18  17:37    


 


Urine Glucose (UA)  Negative  (NEGATIVE)   01/11/18  17:37    


 


Urine Ketones  Trace  (NEGATIVE)   01/11/18  17:37    


 


Urine Blood  Negative  (NEGATIVE)   01/11/18  17:37    


 


Urine Nitrite  Negative  (NEGATIVE)   01/11/18  17:37    


 


Urine Bilirubin  Negative  (NEGATIVE)   01/11/18  17:37    


 


Urine Urobilinogen  0.2  (0.2-1.0)   01/11/18  17:37    


 


Ur Leukocyte Esterase  Negative  (NEGATIVE)   01/11/18  17:37    


 


Stool Occult Blood  Negative  (NEGATIVE)   01/16/18  18:00    














- ....Imaging


EKG: Other (tele: afib, good HR)





Assessment/Plan





Echo 1/12/18: nl LV/EF. RV tds. dilated LA/RA. large complex echodensity in RA, 

protrudes into IVC c/w thrombus. mod TR. RVSP 47.





CTA:  extensive bilateral PE.  1 cm RUL mass suspicious for malignancy.  Small 

bilateral pleural effusions and basilar atelectasis R>L.  





LE duplex: bilat DVTs (extensive RLE)








85 year-old female with a PMH significant for HTN, atrial fibrillation off a/c 

x 3 weeks, ? systolic heart failure, carotid artery disease, venous stasis 

ulcers, and dementia. + upper respiratory symptoms, including a dry cough, x 10 

days. At PMD office on DOA she became cold and clammy and she had a brief 

episode of unresponsiveness. Her blood pressure was noted to be low. Returned 

to baseline mental status. In the ED, the patient was at her baseline mental 

status. Patient was taken off Xarelto three weeks ago following a fall and 

injury to her right leg causing a hematoma which required surgical incision at 

[facility].








extensive LE DVT, RA thrombus:


-probable thrombus in RA, extending from IVC. likely from extensive RLE thrombus

, in transit in setting of both extensive bilateral DVT (? provoked, given 

recent fall with injury and surgical procedure)  and extensive bilateral PE 

with concern for RUL malignancy.  


- RV not well seen on echo but remains hemodynamically stable, oxygenating 

well.  Tolerating AC.  No strong indication for either ivc placement or 

thrombolysis at this time.  However, given intracardiac thrombus and extensive 

clot burden, she is at risk for clinical deterioration.  can consider need for 

thrombolysis if she decompensates clinically.  Would confirm no brain mets or 

recent cva since those clinical scenarios would be contraindications to 

thrombolytics.  


-for now, continue UFH as doing, ? change to NOAC or lovenox now that 

creatinine has improved, will defer to hematology. 


-if she remains clinically stable, will rec repeat imaging after 2-4 weeks of AC

, expect thrombus to organize and/or break up


- malignancy w/u per  hematology consult





syncope:


-brief LOC with hypotension on DOA at PMD office


-likely sec to PE or ? right sided cardiac output obstruction from RA mass


-transient sx's, hemodynamically stable since


-mgmt plan as above


-not orthostatic here





Afib:


-HR controlled, cont atenolol


-AC as above





HTN:


-bp controlled, same meds





SHIVA:


-likely vol depleted/prerenal


-numbers improved s/p  IVF

## 2018-01-17 NOTE — PN
Progress Note, Physician


History of Present Illness: 





pulmonary





alert,comfortable,-resp distress





- Current Medication List


Current Medications: 


Active Medications





Atenolol (Tenormin -)  50 mg PO DAILY [facility]


   Last Admin: 01/17/18 09:25 Dose:  50 mg


Donepezil HCl (Aricept -)  5 mg PO HS [facility]


   Last Admin: 01/16/18 22:56 Dose:  5 mg


Heparin Sodium (Porcine) (Heparin -)  1,000 unit IVPUSH PRN PRN


   PRN Reason: Heparin


   Last Admin: 01/15/18 09:31 Dose:  1,000 unit


Heparin Sodium (Porcine) (Heparin -)  5,000 unit IVPUSH PRN PRN


   PRN Reason: Heparin


   Last Admin: 01/13/18 00:27 Dose:  5,000 unit


Sodium Chloride (Normal Saline -)  1,000 mls @ 42 mls/hr IV ASDIR [facility]


   Last Admin: 01/16/18 23:20 Dose:  Not Given


HEPARIN SOD,PORK IN 0.45% NACL (Heparin-1/2ns 25,000 Units/500)  25,000 units 

in 500 mls @ 20 mls/hr IVPB TITR JOSE L; 1,000 UNITS/HR


   PRN Reason: Protocol


   Last Admin: 01/17/18 09:25 Dose:  1,000 units/hr, 20 mls/hr


Insulin Aspart (Novolog Vial Sliding Scale -)  1 vial SQ ACHS [facility]


   PRN Reason: Protocol


   Last Admin: 01/17/18 12:08 Dose:  6 units











- Objective


Vital Signs: 


 Vital Signs











Temperature  98 F   01/17/18 09:30


 


Pulse Rate  80   01/17/18 09:30


 


Respiratory Rate  20   01/17/18 09:30


 


Blood Pressure  130/66   01/17/18 09:30


 


O2 Sat by Pulse Oximetry (%)  95   01/17/18 07:47











Constitutional: Yes: Well Nourished, Calm


Eyes: Yes: WNL


HENT: Yes: WNL


Neck: Yes: WNL


Cardiovascular: Yes: Pulse Irregular, S1, S2


Respiratory: Yes: Diminished


Gastrointestinal: Yes: Normal Bowel Sounds, Soft


Extremities: Yes: WNL


Edema: Yes


Labs: 


 CBC, BMP





 01/17/18 06:50 





 01/17/18 06:50 





 INR, PTT











INR  1.33  (0.82-1.09)  H  01/11/18  15:30    














Assessment/Plan





Problem List





- Problems


(1) DVT (deep venous thrombosis)


Code(s): I82.409 - ACUTE EMBOLISM AND THOMBOS UNSP DEEP VN UNSP LOWER EXTREMITY

   





(2) Right atrial thrombus


Code(s): JIE5228 -    





(3) IVC thrombosis


Code(s): I82.220 - ACUTE EMBOLISM AND THROMBOSIS OF INFERIOR VENA CAVA   





(4) Acute kidney injury


Code(s): N17.9 - ACUTE KIDNEY FAILURE, UNSPECIFIED   





(5) Elevated troponin


Code(s): R74.8 - ABNORMAL LEVELS OF OTHER SERUM ENZYMES   





(6) Lactic acidosis


Code(s): E87.2 - ACIDOSIS   





(7) Atrial fibrillation


Code(s): I48.91 - UNSPECIFIED ATRIAL FIBRILLATION   





(8) CAD (coronary artery disease)


Code(s): I25.10 - ATHSCL HEART DISEASE OF NATIVE CORONARY ARTERY W/O ANG PCTRS 

  





(9) Systolic dysfunction, left ventricle


Code(s): I51.9 - HEART DISEASE, UNSPECIFIED   





(10) Dementia


Code(s): F03.90 - UNSPECIFIED DEMENTIA WITHOUT BEHAVIORAL DISTURBANCE   





Assessment/Plan





Extensive RLE DVT,,LLE


Large Bilateral pulmonary emboli main pulmonary arteries


RA/IVC thrombus


Likely PE


Acute Kidney Injury improved


Atrial Fibrillation


+Troponins


LV Systolic Dysfunction


Dementia


RUL Nodule/mass





-   anticoagulation


-  O2 to keep Spo2 >90%


-  IVF


-  monitor urine output, creatinine





DR DOBBS

## 2018-01-17 NOTE — PN
Progress Note (short form)





- Note


Progress Note: 


Pt seen and examined.








O/E:


General: NAD,alert and awake


Eyes: Yes: Conjunctiva Clear, EOM Intact


HENT: Yes: Atraumatic, Normocephalic


Neck: Yes: Supple, Trachea Midline


Cardiovascular: Yes: Regular Rate and Rhythm


Respiratory: Yes: Diminished (decreased breath sounds at the bases)


Gastrointestinal: Yes: Normal Bowel Sounds, Soft.  No: Tenderness


Edema: Yes





 


 Last Vital Signs











Temp Pulse Resp BP Pulse Ox


 


 97.9 F   86   18   103/67   95 


 


 01/17/18 14:37  01/17/18 14:37  01/17/18 14:37  01/17/18 14:37  01/17/18 07:47








 CBC, BMP





 01/17/18 06:50 





 01/17/18 06:50 





 Current Medications











Generic Name Dose Route Start Last Admin





  Trade Name Freq  PRN Reason Stop Dose Admin


 


Atenolol  50 mg  01/12/18 10:00  01/17/18 09:25





  Tenormin -  PO   50 mg





  DAILY JOSE L   Administration


 


Donepezil HCl  5 mg  01/12/18 22:00  01/16/18 22:56





  Aricept -  PO   5 mg





  HS JOSE L   Administration


 


Heparin Sodium (Porcine)  1,000 unit  01/12/18 19:18  01/15/18 09:31





  Heparin -  IVPUSH   1,000 unit





  PRN PRN   Administration





  Heparin   


 


Heparin Sodium (Porcine)  5,000 unit  01/12/18 19:18  01/13/18 00:27





  Heparin -  IVPUSH   5,000 unit





  PRN PRN   Administration





  Heparin   


 


Sodium Chloride  1,000 mls @ 42 mls/hr  01/11/18 23:45  01/16/18 23:20





  Normal Saline -  IV   Not Given





  ASDIR JOSE L   


 


HEPARIN SOD,PORK IN 0.45% NACL  25,000 units in 500 mls @ 20 mls/hr  01/12/18 21

:30  01/17/18 09:25





  Heparin-1/2ns 25,000 Units/500  IVPB   1,000 units/hr





  TITR JOSE L   20 mls/hr





  Protocol   Administration





  1,000 UNITS/HR   


 


Insulin Aspart  1 vial  01/13/18 16:30  01/17/18 17:07





  Novolog Vial Sliding Scale -  SQ   2 units





  ACHS JOSE L   Administration





  Protocol   











RLE,LLE DVT


RA thrombus


extensive PE


RUL mass suspicious for malignancy


Atrial Fibrillation


+Troponins


LV Systolic Dysfunction


?Renal mass


Dementia





Was off xeralto since 12/12 when she had a fall and was admitted to [facility] until 

day before Tash[facility]. Has been off xeralto and developed extensive DVT/Rt. 

atrial  clot





likely from an underlying malignancy causing a hypercoagulable state.





?Lung mass suspicious for neoplastic process has a ?renal mass. 





would recommend CT a/p with contrast , with pre-hydration and pre meds. 





Switch to LMWH therapeutic dose from am. 





for labs





?family ?social situation.

## 2018-01-17 NOTE — PN
Physical Exam: 


SUBJECTIVE: Patient seen and examined. She appears much improved today. More 

alert, no complaints, breathing stable 








OBJECTIVE:





 Vital Signs











 Period  Temp  Pulse  Resp  BP Sys/Ramsey  Pulse Ox


 


 Last 24 Hr  97.3 F-98 F  80-90  20-20  104-146/65-89  95-95








PE


Neuro: alert, awake, cn 2-12intact


Pulm: basilar crackles 


CV: s1 s2 irregular rhythm 


Abd: s nt nd + bs


ExT: RLE incision packed, CDI + tenderness 


 Laboratory Results - last 24 hr











  01/17/18 01/17/18 01/17/18





  05:34 06:50 06:50


 


WBC   


 


RBC   


 


Hgb   


 


Hct   


 


MCV   


 


MCH   


 


MCHC   


 


RDW   


 


Plt Count   


 


MPV   


 


Neutrophils %   


 


Lymphocytes %   


 


Monocytes %   


 


Eosinophils %   


 


Basophils %   


 


Retic Count    3.37 H


 


PTT (Actin FS)   37.0 H D 


 


Sodium   


 


Potassium   


 


Chloride   


 


Carbon Dioxide   


 


Anion Gap   


 


BUN   


 


Creatinine   


 


POC Glucometer  181  


 


Random Glucose   


 


Calcium   


 


Ferritin   


 


LD Total   


 


Vitamin B12   


 


Stool Occult Blood   














  01/17/18 01/17/18 01/17/18





  06:50 06:50 12:02


 


WBC   20.9 H D 


 


RBC   3.56 L 


 


Hgb   9.6 L 


 


Hct   30.8 L 


 


MCV   86.4 


 


MCH   26.8 


 


MCHC   31.0 L 


 


RDW   15.2 


 


Plt Count   243 


 


MPV   8.7 


 


Neutrophils %   83.6 H 


 


Lymphocytes %   11.2  D 


 


Monocytes %   5.1  D 


 


Eosinophils %   0.0 


 


Basophils %   0.1 


 


Retic Count   


 


PTT (Actin FS)   


 


Sodium  141  


 


Potassium  4.5  


 


Chloride  108 H  


 


Carbon Dioxide  25  D  


 


Anion Gap  8  


 


BUN  28 H  


 


Creatinine  1.1 H  


 


POC Glucometer    255


 


Random Glucose  142 H  


 


Calcium  8.5  


 


Ferritin  74.820  


 


LD Total  225  


 


Vitamin B12  1678 H  


 


Stool Occult Blood   








Active Medications











Generic Name Dose Route Start Last Admin





  Trade Name Freq  PRN Reason Stop Dose Admin


 


Atenolol  50 mg  01/12/18 10:00  01/17/18 09:25





  Tenormin -  PO   50 mg





  DAILY JOSE L   Administration


 


Donepezil HCl  5 mg  01/12/18 22:00  01/16/18 22:56





  Aricept -  PO   5 mg





  HS JOSE L   Administration


 


Heparin Sodium (Porcine)  1,000 unit  01/12/18 19:18  01/15/18 09:31





  Heparin -  IVPUSH   1,000 unit





  PRN PRN   Administration





  Heparin   


 


Heparin Sodium (Porcine)  5,000 unit  01/12/18 19:18  01/13/18 00:27





  Heparin -  IVPUSH   5,000 unit





  PRN PRN   Administration





  Heparin   


 


Sodium Chloride  1,000 mls @ 42 mls/hr  01/11/18 23:45  01/16/18 23:20





  Normal Saline -  IV   Not Given





  ASDIR JOSE L   


 


HEPARIN SOD,PORK IN 0.45% NACL  25,000 units in 500 mls @ 20 mls/hr  01/12/18 21

:30  01/17/18 09:25





  Heparin-1/2ns 25,000 Units/500  IVPB   1,000 units/hr





  TITR JOSE L   20 mls/hr





  Protocol   Administration





  1,000 UNITS/HR   


 


Insulin Aspart  1 vial  01/13/18 16:30  01/17/18 12:08





  Novolog Vial Sliding Scale -  SQ   6 units





  ACHS JOSE L   Administration





  Protocol   











Imaging: 


- ECHO 1/12/18: nl LV/EF. RV tds. dilated LA/RA. large complex echodensity in RA

, protrudes into IVC c/w thrombus. mod TR. RVSP 47.





Assessment: 85 year-old female with a PMH significant for HTN, Afib off a/c x 3 

weeks following a fall and injury to her right leg causing a hematoma which 

required surgical incision at [facility],systolic heart failure, carotid artery 

disease, venous stasis ulcers,and dementia, admitted with syncope and URI 

symptoms. Found to have bilateral DVTs and RA thrombus.





Plan: 


1. Extensive RA thrombus, Pulmonary emboli 


- CTA shows PE along with RA thrombus


- Possible provoked d/t recent fall and surgery, however now with PE concern 

malignancy 


- Tolerating AC, Heme to rec outpt AC regimen 


- Per cards, at this tiem no strong indication for IVC placement or thrombolysis

, if decompensates consider thrombolysis 


- Prior to any thrombolysis will need to calify and eval for any exclusion 

factors ie aortic aneurysm,  brain mets, cva 


- Malignancy work up per Heme 


- Obtain Chest CT with contrast Friday 48 following CTA, pt will need to be pre 

medicated and hydrated 





2. Chest CTA


- Pulmonary nodule?


- Obtain CT chest w contrast Friday, see above 


- Renal ca work up underway 


- Heme following 





2. Bilateral LE DVT


- Maintain heparin gtt





3. Syncope


- Possible PE induced vs RA mass 


- Negative for orthostatics, CD negative 


- No telemetry events





4. Leukocytosis 


- Likely due to DVT/PE 





5. A fib


- Rate controlled


- Atenolol 50mg daily 


- Heparin gtt





6. Systolic heart failure


- Not in exacerbation 


- Hold lasix 





7. SHIVA


- Improved


- Continue gentle IVF 


- Hold lasix/ACE





8. Right lower extremity wound


- Continue xeroform and sterile gauze


- Surgery follow up as outpt 





9. Dementia


- Continue Donepezil





10. PPX


- Heparin gtt


- PT 





11. DM II 


- ISS, BGM ACHS 








Visit type





- Emergency Visit


Emergency Visit: Yes


ED Registration Date: 01/12/18


Care time: The patient presented to the Emergency Department on the above date 

and was hospitalized for further evaluation of their emergent condition.





- New Patient


This patient is new to me today: No





- Critical Care


Critical Care patient: No

## 2018-01-18 LAB
ALBUMIN SERPL-MCNC: 2.1 G/DL (ref 3.4–5)
ALP SERPL-CCNC: 74 U/L (ref 45–117)
ALT SERPL-CCNC: 14 U/L (ref 12–78)
ANION GAP SERPL CALC-SCNC: 8 MMOL/L (ref 8–16)
AST SERPL-CCNC: 10 U/L (ref 15–37)
BILIRUB SERPL-MCNC: 0.6 MG/DL (ref 0.2–1)
BUN SERPL-MCNC: 27 MG/DL (ref 7–18)
CALCIUM SERPL-MCNC: 7.9 MG/DL (ref 8.5–10.1)
CHLORIDE SERPL-SCNC: 110 MMOL/L (ref 98–107)
CO2 SERPL-SCNC: 24 MMOL/L (ref 21–32)
CREAT SERPL-MCNC: 1 MG/DL (ref 0.55–1.02)
DEPRECATED RDW RBC AUTO: 15 % (ref 11.6–15.6)
GLUCOSE SERPL-MCNC: 133 MG/DL (ref 74–106)
HCT VFR BLD CALC: 32.7 % (ref 32.4–45.2)
HGB BLD-MCNC: 10.2 GM/DL (ref 10.7–15.3)
MCH RBC QN AUTO: 26.9 PG (ref 25.7–33.7)
MCHC RBC AUTO-ENTMCNC: 31.1 G/DL (ref 32–36)
MCV RBC: 86.4 FL (ref 80–96)
PLATELET # BLD AUTO: 239 K/MM3 (ref 134–434)
PMV BLD: 8.8 FL (ref 7.5–11.1)
POTASSIUM SERPLBLD-SCNC: 4.5 MMOL/L (ref 3.5–5.1)
PROT SERPL-MCNC: 5.2 G/DL (ref 6.4–8.2)
RBC # BLD AUTO: 3.78 M/MM3 (ref 3.6–5.2)
SERUM IRON SATURATION: 9 % (ref 15–55)
SODIUM SERPL-SCNC: 142 MMOL/L (ref 136–145)
TIBC SERPL-MCNC: 271 UG/DL (ref 250–450)
UIBC SERPL-MCNC: 246 UG/DL (ref 118–369)
WBC # BLD AUTO: 15.8 K/MM3 (ref 4–10)

## 2018-01-18 RX ADMIN — INSULIN ASPART SCH UNITS: 100 INJECTION, SOLUTION INTRAVENOUS; SUBCUTANEOUS at 11:23

## 2018-01-18 RX ADMIN — INSULIN ASPART SCH UNITS: 100 INJECTION, SOLUTION INTRAVENOUS; SUBCUTANEOUS at 17:10

## 2018-01-18 RX ADMIN — INSULIN ASPART SCH UNITS: 100 INJECTION, SOLUTION INTRAVENOUS; SUBCUTANEOUS at 23:14

## 2018-01-18 RX ADMIN — DONEPEZIL HYDROCHLORIDE SCH MG: 5 TABLET, FILM COATED ORAL at 23:13

## 2018-01-18 RX ADMIN — ENOXAPARIN SODIUM SCH MG: 80 INJECTION SUBCUTANEOUS at 09:49

## 2018-01-18 RX ADMIN — ATENOLOL SCH MG: 50 TABLET ORAL at 09:49

## 2018-01-18 RX ADMIN — ENOXAPARIN SODIUM SCH MG: 80 INJECTION SUBCUTANEOUS at 23:13

## 2018-01-18 RX ADMIN — INSULIN ASPART SCH: 100 INJECTION, SOLUTION INTRAVENOUS; SUBCUTANEOUS at 06:23

## 2018-01-18 NOTE — PN
Progress Note (short form)





- Note


Progress Note: 


Overall breathing improviing. 


No CP. 


No acute overnight. 





 Intake & Output











 01/15/18 01/16/18 01/17/18 01/18/18





 23:59 23:59 23:59 23:59


 


Intake Total 1099 1416 1098 624


 


Balance 1099 1416 1098 624








 Last Vital Signs











Temp Pulse Resp BP Pulse Ox


 


 98.0 F   82   18   150/99   93 L


 


 01/18/18 09:00  01/18/18 09:00  01/18/18 09:00  01/18/18 09:00  01/18/18 09:00








Active Medications





Atenolol (Tenormin -)  50 mg PO DAILY [facility]


   Last Admin: 01/18/18 09:49 Dose:  50 mg


Donepezil HCl (Aricept -)  5 mg PO HS [facility]


   Last Admin: 01/17/18 22:29 Dose:  5 mg


Enoxaparin Sodium (Lovenox -)  70 mg SQ BID [facility]


   Last Admin: 01/18/18 09:49 Dose:  70 mg


Sodium Chloride (Normal Saline -)  1,000 mls @ 42 mls/hr IV ASDIR [facility]


   Last Admin: 01/16/18 23:20 Dose:  Not Given


Insulin Aspart (Novolog Vial Sliding Scale -)  1 vial SQ ACHS [facility]


   PRN Reason: Protocol


   Last Admin: 01/18/18 11:23 Dose:  4 units











Constitutional: Yes: NAD 


Eyes: Yes: WNL


HENT: Yes: WNL


Neck: Yes: WNL


Cardiovascular: Yes: Pulse Irregular, S1, S2


Respiratory: Yes: Diminished at the bases 


Gastrointestinal: Yes: Normal Bowel Sounds, Soft


Extremities: Yes: WNL


Edema: Yes


Labs: 


 


 Laboratory Results - last 24 hr











  01/17/18 01/17/18 01/17/18





  06:50 17:00 17:00


 


WBC   


 


RBC   


 


Hgb   


 


Hct   


 


MCV   


 


MCH   


 


MCHC   


 


RDW   


 


Plt Count   


 


MPV   


 


PTT (Actin FS)   


 


Sodium   


 


Potassium   


 


Chloride   


 


Carbon Dioxide   


 


Anion Gap   


 


BUN   


 


Creatinine   


 


Creat Clearance w eGFR   


 


POC Glucometer   


 


Random Glucose   


 


Calcium   


 


Iron  25 L  


 


TIBC  271  


 


Iron Saturation  9 L  


 


Total Bilirubin   


 


AST   


 


ALT   


 


Alkaline Phosphatase   


 


Total Protein   


 


Albumin   


 


Ur Random Sodium   37 


 


Urine Creatinine    111.0














  01/17/18 01/17/18 01/17/18





  17:01 19:30 21:58


 


WBC   


 


RBC   


 


Hgb   


 


Hct   


 


MCV   


 


MCH   


 


MCHC   


 


RDW   


 


Plt Count   


 


MPV   


 


PTT (Actin FS)   28.5 


 


Sodium   


 


Potassium   


 


Chloride   


 


Carbon Dioxide   


 


Anion Gap   


 


BUN   


 


Creatinine   


 


Creat Clearance w eGFR   


 


POC Glucometer  178   198


 


Random Glucose   


 


Calcium   


 


Iron   


 


TIBC   


 


Iron Saturation   


 


Total Bilirubin   


 


AST   


 


ALT   


 


Alkaline Phosphatase   


 


Total Protein   


 


Albumin   


 


Ur Random Sodium   


 


Urine Creatinine   














  01/18/18 01/18/18 01/18/18





  05:35 05:35 05:35


 


WBC  15.8 H  


 


RBC  3.78  


 


Hgb  10.2 L  


 


Hct  32.7  


 


MCV  86.4  


 


MCH  26.9  


 


MCHC  31.1 L  


 


RDW  15.0  


 


Plt Count  239  


 


MPV  8.8  


 


PTT (Actin FS)   33.6 


 


Sodium    142


 


Potassium    4.5


 


Chloride    110 H


 


Carbon Dioxide    24


 


Anion Gap    8


 


BUN    27 H


 


Creatinine    1.0


 


Creat Clearance w eGFR    52.69


 


POC Glucometer   


 


Random Glucose    133 H


 


Calcium    7.9 L


 


Iron   


 


TIBC   


 


Iron Saturation   


 


Total Bilirubin    0.6  D


 


AST    10 L


 


ALT    14


 


Alkaline Phosphatase    74


 


Total Protein    5.2 L


 


Albumin    2.1 L


 


Ur Random Sodium   


 


Urine Creatinine   














  01/18/18





  11:22


 


WBC 


 


RBC 


 


Hgb 


 


Hct 


 


MCV 


 


MCH 


 


MCHC 


 


RDW 


 


Plt Count 


 


MPV 


 


PTT (Actin FS) 


 


Sodium 


 


Potassium 


 


Chloride 


 


Carbon Dioxide 


 


Anion Gap 


 


BUN 


 


Creatinine 


 


Creat Clearance w eGFR 


 


POC Glucometer  215


 


Random Glucose 


 


Calcium 


 


Iron 


 


TIBC 


 


Iron Saturation 


 


Total Bilirubin 


 


AST 


 


ALT 


 


Alkaline Phosphatase 


 


Total Protein 


 


Albumin 


 


Ur Random Sodium 


 


Urine Creatinine 

















Assessment/Plan





Problem List





- Problems


(1) DVT (deep venous thrombosis)


Code(s): I82.409 - ACUTE EMBOLISM AND THOMBOS UNSP DEEP VN UNSP LOWER EXTREMITY

   





(2) Right atrial thrombus


Code(s): [id number] -    





(3) IVC thrombosis


Code(s): I82.220 - ACUTE EMBOLISM AND THROMBOSIS OF INFERIOR VENA CAVA   





(4) Acute kidney injury


Code(s): N17.9 - ACUTE KIDNEY FAILURE, UNSPECIFIED   





(5) Elevated troponin


Code(s): R74.8 - ABNORMAL LEVELS OF OTHER SERUM ENZYMES   





(6) Lactic acidosis


Code(s): E87.2 - ACIDOSIS   





(7) Atrial fibrillation


Code(s): I48.91 - UNSPECIFIED ATRIAL FIBRILLATION   





(8) CAD (coronary artery disease)


Code(s): I25.10 - ATHSCL HEART DISEASE OF NATIVE CORONARY ARTERY W/O ANG PCTRS 

  





(9) Systolic dysfunction, left ventricle


Code(s): I51.9 - HEART DISEASE, UNSPECIFIED   





(10) Dementia


Code(s): F03.90 - UNSPECIFIED DEMENTIA WITHOUT BEHAVIORAL DISTURBANCE   





Assessment/Plan


Extensive RLE DVT,,LLE


Large Bilateral pulmonary emboli main pulmonary arteries


RA/IVC thrombus


Likely PE


Acute Kidney Injury improved


Atrial Fibrillation


+Troponins


LV Systolic Dysfunction


Dementia


RUL Nodule/mass








- Lovenox 70mg SQ BID


- Will need further imaging and eventual tissue diagnosis once stable from this 

acute episode 


- O2 to maintain saturation 


- IVF


- monitor urine output, creatinine





Dr Mancia

## 2018-01-18 NOTE — PN
Progress Note (short form)





- Note


Progress Note: 





Progress Note (short form)





- Note


Progress Note: 





Chief Complaint: 





syncope


History of Present Illness: 





On IVF. hgb stable on heparin drip. Extensive bilateral PE.  


no cp, sob, palpit, syncope





no cigs





 


 


 Current Medications











Generic Name Dose Route Start Last Admin





  Trade Name Michaelq  PRN Reason Stop Dose Admin


 


Atenolol  50 mg  01/12/18 10:00  01/18/18 09:49





  Tenormin -  PO   50 mg





  DAILY JOSE L   Administration


 


Donepezil HCl  5 mg  01/12/18 22:00  01/17/18 22:29





  Aricept -  PO   5 mg





  HS JOSE L   Administration


 


Enoxaparin Sodium  70 mg  01/17/18 22:00  01/18/18 09:49





  Lovenox -  SQ   70 mg





  BID JOSE L   Administration


 


Sodium Chloride  1,000 mls @ 42 mls/hr  01/11/18 23:45  01/16/18 23:20





  Normal Saline -  IV   Not Given





  ASDIR JOSE L   


 


Insulin Aspart  1 vial  01/13/18 16:30  01/18/18 11:23





  Novolog Vial Sliding Scale -  SQ   4 units





  ACHS JOSE L   Administration





  Protocol   








 Vital Signs











 Period  Temp  Pulse  Resp  BP Sys/Ramsey  Pulse Ox


 


 Last 24 Hr  97.6 F-98.3 F  82-97  18-20  103-150/65-99  93











Constitutional: Yes: No Distress, Calm


Eyes: No: Sclera Icterus


HENT: No: Nasal Congestion


Cardiovascular: Yes: Regular Rate and Rhythm, S1, S2, Other (PMI non diplaced).

  No: Gallop, Murmur


Respiratory: Yes: CTA Bilaterally.  No: Accessory Muscle Use, Rales, Wheezes


Gastrointestinal: Yes: Normal Bowel Sounds, Soft.  No: Tenderness


Musculoskeletal: Yes: Other (No kyphosis)


Extremities: No: Cyanosis


Edema: No


Integumentary: No: Jaundice


Neurological: Yes: Alert, Oriented (x3)


Psychiatric: No: Agitated


Labs: 


 





 


 


 CBC, BMP





 01/18/18 05:35 





 01/18/18 05:35 











- ....Imaging


EKG: Other (tele: afib, good HR)





Assessment/Plan





Echo 1/12/18: nl LV/EF. RV tds. dilated LA/RA. large complex echodensity in RA, 

protrudes into IVC c/w thrombus. mod TR. RVSP 47.





CTA:  extensive bilateral PE.  1 cm RUL mass suspicious for malignancy.  Small 

bilateral pleural effusions and basilar atelectasis R>L.  





LE duplex: bilat DVTs (extensive RLE)








85 year-old female with a PMH significant for HTN, atrial fibrillation off a/c 

x 3 weeks, ? systolic heart failure, carotid artery disease, venous stasis 

ulcers, and dementia. + upper respiratory symptoms, including a dry cough, x 10 

days. At PMD office on DOA she became cold and clammy and she had a brief 

episode of unresponsiveness. Her blood pressure was noted to be low. Returned 

to baseline mental status. In the ED, the patient was at her baseline mental 

status. Patient was taken off Xarelto three weeks ago following a fall and 

injury to her right leg causing a hematoma which required surgical incision at 

[facility].








extensive LE DVT, RA thrombus:


-probable thrombus in RA, extending from IVC. likely from extensive RLE thrombus

, in transit in setting of both extensive bilateral DVT (? provoked, given 

recent fall with injury and surgical procedure)  and extensive bilateral PE 

with concern for RUL malignancy.  


- RV not well seen on echo but remains hemodynamically stable, oxygenating 

well.  Tolerating AC.  No strong indication for either ivc placement or 

thrombolysis at this time.  However, given intracardiac thrombus and extensive 

clot burden, she is at risk for clinical deterioration.  can consider need for 

thrombolysis if she decompensates clinically.  Would confirm no brain mets or 

recent cva since those clinical scenarios would be contraindications to 

thrombolytics.  


-on lovenox per Heme


-if she remains clinically stable, will rec repeat imaging after 2-4 weeks of AC

, expect thrombus to organize and/or break up


- malignancy w/u per  hematology consult





syncope:


-brief LOC with hypotension on DOA at PMD office


-likely sec to PE or ? right sided cardiac output obstruction from RA mass


-transient sx's, hemodynamically stable since


-mgmt plan as above


-not orthostatic here





Afib:


-HR controlled, cont atenolol


-AC as above





HTN:


-bp controlled, same meds





SHIVA:


-likely vol depleted/prerenal


-numbers improved s/p  IVF

## 2018-01-18 NOTE — PN
Physical Exam: 


SUBJECTIVE: Patient seen and examined at the bedside.  Sitting in chair, in no 

acute distress.


Denies chest pain, denies any other discomfort.  


Aide at the bedside.





OBJECTIVE:





 Vital Signs











 Period  Temp  Pulse  Resp  BP Sys/Ramsey  Pulse Ox


 


 Last 24 Hr  97.6 F-98.3 F  82-97  18-20  103-150/65-99  93-93











GENERAL: The patient is awake in no acute distress, periods of forgetfullness/

confusion


HEAD: Normal with no signs of trauma.


EYES: PERRL, extraocular movements intact, sclera anicteric, conjunctiva clear. 

No ptosis. 


ENT:  moist mucous membranes.


NECK: Trachea midline, full range of motion, supple. 


LUNGS: diminished breath sounds bilaterally


HEART: irregular heart rate


ABDOMEN: Soft, nontender, nondistended


EXTREMITIES: s/p right leg hematoma evacuation, dressings daily


NEUROLOGICAL:  Normal speech, gait not observed.


PSYCH: Normal mood, normal affect.


SKIN: Warm, dry, normal turgor, no rashes or lesions noted














 Laboratory Results - last 24 hr











  01/17/18 01/17/18 01/17/18





  06:50 17:00 17:00


 


WBC   


 


RBC   


 


Hgb   


 


Hct   


 


MCV   


 


MCH   


 


MCHC   


 


RDW   


 


Plt Count   


 


MPV   


 


PTT (Actin FS)   


 


Sodium   


 


Potassium   


 


Chloride   


 


Carbon Dioxide   


 


Anion Gap   


 


BUN   


 


Creatinine   


 


Creat Clearance w eGFR   


 


POC Glucometer   


 


Random Glucose   


 


Calcium   


 


Iron  25 L  


 


TIBC  271  


 


Iron Saturation  9 L  


 


Total Bilirubin   


 


AST   


 


ALT   


 


Alkaline Phosphatase   


 


Total Protein   


 


Albumin   


 


Ur Random Sodium   37 


 


Urine Creatinine    111.0














  01/17/18 01/17/18 01/17/18





  17:01 19:30 21:58


 


WBC   


 


RBC   


 


Hgb   


 


Hct   


 


MCV   


 


MCH   


 


MCHC   


 


RDW   


 


Plt Count   


 


MPV   


 


PTT (Actin FS)   28.5 


 


Sodium   


 


Potassium   


 


Chloride   


 


Carbon Dioxide   


 


Anion Gap   


 


BUN   


 


Creatinine   


 


Creat Clearance w eGFR   


 


POC Glucometer  178   198


 


Random Glucose   


 


Calcium   


 


Iron   


 


TIBC   


 


Iron Saturation   


 


Total Bilirubin   


 


AST   


 


ALT   


 


Alkaline Phosphatase   


 


Total Protein   


 


Albumin   


 


Ur Random Sodium   


 


Urine Creatinine   














  01/18/18 01/18/18 01/18/18





  05:35 05:35 05:35


 


WBC  15.8 H  


 


RBC  3.78  


 


Hgb  10.2 L  


 


Hct  32.7  


 


MCV  86.4  


 


MCH  26.9  


 


MCHC  31.1 L  


 


RDW  15.0  


 


Plt Count  239  


 


MPV  8.8  


 


PTT (Actin FS)   33.6 


 


Sodium    142


 


Potassium    4.5


 


Chloride    110 H


 


Carbon Dioxide    24


 


Anion Gap    8


 


BUN    27 H


 


Creatinine    1.0


 


Creat Clearance w eGFR    52.69


 


POC Glucometer   


 


Random Glucose    133 H


 


Calcium    7.9 L


 


Iron   


 


TIBC   


 


Iron Saturation   


 


Total Bilirubin    0.6  D


 


AST    10 L


 


ALT    14


 


Alkaline Phosphatase    74


 


Total Protein    5.2 L


 


Albumin    2.1 L


 


Ur Random Sodium   


 


Urine Creatinine   














  01/18/18





  11:22


 


WBC 


 


RBC 


 


Hgb 


 


Hct 


 


MCV 


 


MCH 


 


MCHC 


 


RDW 


 


Plt Count 


 


MPV 


 


PTT (Actin FS) 


 


Sodium 


 


Potassium 


 


Chloride 


 


Carbon Dioxide 


 


Anion Gap 


 


BUN 


 


Creatinine 


 


Creat Clearance w eGFR 


 


POC Glucometer  215


 


Random Glucose 


 


Calcium 


 


Iron 


 


TIBC 


 


Iron Saturation 


 


Total Bilirubin 


 


AST 


 


ALT 


 


Alkaline Phosphatase 


 


Total Protein 


 


Albumin 


 


Ur Random Sodium 


 


Urine Creatinine 








Active Medications











Generic Name Dose Route Start Last Admin





  Trade Name Freq  PRN Reason Stop Dose Admin


 


Atenolol  50 mg  01/12/18 10:00  01/18/18 09:49





  Tenormin -  PO   50 mg





  DAILY JOSE L   Administration


 


Donepezil HCl  5 mg  01/12/18 22:00  01/17/18 22:29





  Aricept -  PO   5 mg





  HS JOSE L   Administration


 


Enoxaparin Sodium  70 mg  01/17/18 22:00  01/18/18 09:49





  Lovenox -  SQ   70 mg





  BID JOSE L   Administration


 


Sodium Chloride  1,000 mls @ 42 mls/hr  01/11/18 23:45  01/16/18 23:20





  Normal Saline -  IV   Not Given





  ASDIR JOSE L   


 


Insulin Aspart  1 vial  01/13/18 16:30  01/18/18 11:23





  Novolog Vial Sliding Scale -  SQ   4 units





  ACHS JOSE L   Administration





  Protocol   











ASSESSMENT/PLAN:





Patient is an 85 year old female with a significant pat medical history of HTN, 

Afib off a/c x 3 weeks following a fall and injury to her right leg causing a 

hematoma (s/p surgical evacuation), systolic heart failure, carotid artery 

disease, venous stasis ulcers,and dementia.  Patient was admitted on 1/12/2018  

with syncope and URI symptoms. Found to have bilateral DVTs and RA thrombus.





Hematology/Oncology:


Extensive RA thrombus, Pulmonary emboli 


Bilateral DVTs


CTA shows PE along with RA thrombus, bilateral DVT


May be provoked 2/2 recent surgery vs. malignancy cause


On Lovenox 70mg BID


No IVC placement or systemic thrombolysis as per cardiologist unless patient 

decomensates


Continue malignancy workup as per oncology


Chest CT with contrast tomorrow to rule out lung mets, possible nodule


Head CT to rule out brain mets





Cardiology:


A fib, controlled


Atenelol 50mg daily, Lovenox BID





Systolic heart failure


Lasix on hold for SHIVA


Monitor 





Renal:


SHIVA


Hold Lasix, gentle hydration NS @ 42cc/hr





Skin:


Right lower extremity wound


Dressing changes daily





Endocrine


Diabetes


Monitor blood sugars with BGMs





F.E.N.


Fluids: gentle hydration


Electrolytes: monitor


Nutrition: diabetic diet





Prophylaxis:


DVT: Lovenox 70mg BID


GI: Protonix





Disposition:  full code

## 2018-01-19 LAB
ALBUMIN SERPL-MCNC: 2.1 G/DL (ref 3.4–5)
ALP SERPL-CCNC: 70 U/L (ref 45–117)
ALT SERPL-CCNC: 13 U/L (ref 12–78)
ANION GAP SERPL CALC-SCNC: 9 MMOL/L (ref 8–16)
AST SERPL-CCNC: 11 U/L (ref 15–37)
BASOPHILS # BLD: 0.4 % (ref 0–2)
BILIRUB SERPL-MCNC: 0.7 MG/DL (ref 0.2–1)
BUN SERPL-MCNC: 22 MG/DL (ref 7–18)
CALCIUM SERPL-MCNC: 8.2 MG/DL (ref 8.5–10.1)
CHLORIDE SERPL-SCNC: 108 MMOL/L (ref 98–107)
CO2 SERPL-SCNC: 25 MMOL/L (ref 21–32)
CREAT SERPL-MCNC: 1 MG/DL (ref 0.55–1.02)
DEPRECATED RDW RBC AUTO: 15.4 % (ref 11.6–15.6)
EOSINOPHIL # BLD: 0.4 % (ref 0–4.5)
GLUCOSE SERPL-MCNC: 136 MG/DL (ref 74–106)
HCT VFR BLD CALC: 29.6 % (ref 32.4–45.2)
HGB BLD-MCNC: 9.3 GM/DL (ref 10.7–15.3)
LYMPHOCYTES # BLD: 26.2 % (ref 8–40)
MCH RBC QN AUTO: 27 PG (ref 25.7–33.7)
MCHC RBC AUTO-ENTMCNC: 31.5 G/DL (ref 32–36)
MCV RBC: 85.7 FL (ref 80–96)
MONOCYTES # BLD AUTO: 7.7 % (ref 3.8–10.2)
NEUTROPHILS # BLD: 65.3 % (ref 42.8–82.8)
PLATELET # BLD AUTO: 239 K/MM3 (ref 134–434)
PMV BLD: 8.5 FL (ref 7.5–11.1)
POTASSIUM SERPLBLD-SCNC: 4.2 MMOL/L (ref 3.5–5.1)
PROT SERPL-MCNC: 5.1 G/DL (ref 6.4–8.2)
RBC # BLD AUTO: 3.46 M/MM3 (ref 3.6–5.2)
SODIUM SERPL-SCNC: 142 MMOL/L (ref 136–145)
WBC # BLD AUTO: 12.3 K/MM3 (ref 4–10)

## 2018-01-19 RX ADMIN — ATENOLOL SCH MG: 50 TABLET ORAL at 09:24

## 2018-01-19 RX ADMIN — INSULIN ASPART SCH UNITS: 100 INJECTION, SOLUTION INTRAVENOUS; SUBCUTANEOUS at 11:40

## 2018-01-19 RX ADMIN — SODIUM CHLORIDE SCH: 9 INJECTION, SOLUTION INTRAVENOUS at 21:30

## 2018-01-19 RX ADMIN — ENOXAPARIN SODIUM SCH MG: 80 INJECTION SUBCUTANEOUS at 20:59

## 2018-01-19 RX ADMIN — INSULIN ASPART SCH: 100 INJECTION, SOLUTION INTRAVENOUS; SUBCUTANEOUS at 08:14

## 2018-01-19 RX ADMIN — DONEPEZIL HYDROCHLORIDE SCH MG: 5 TABLET, FILM COATED ORAL at 20:59

## 2018-01-19 RX ADMIN — SODIUM CHLORIDE SCH: 9 INJECTION, SOLUTION INTRAVENOUS at 08:15

## 2018-01-19 RX ADMIN — INSULIN ASPART SCH: 100 INJECTION, SOLUTION INTRAVENOUS; SUBCUTANEOUS at 16:31

## 2018-01-19 RX ADMIN — INSULIN ASPART SCH UNITS: 100 INJECTION, SOLUTION INTRAVENOUS; SUBCUTANEOUS at 20:59

## 2018-01-19 RX ADMIN — ENOXAPARIN SODIUM SCH MG: 80 INJECTION SUBCUTANEOUS at 09:24

## 2018-01-19 NOTE — PN
Progress Note, Physician


History of Present Illness: 





pulmonary





alert,nad,-sob on nasal o2





- Current Medication List


Current Medications: 


Active Medications





Atenolol (Tenormin -)  50 mg PO DAILY [facility]


   Last Admin: 01/19/18 09:24 Dose:  50 mg


Diphenhydramine HCl (Benadryl -)  50 mg PO ONCE ONE


   Stop: 01/20/18 08:01


Donepezil HCl (Aricept -)  5 mg PO HS [facility]


   Last Admin: 01/18/18 23:13 Dose:  5 mg


Enoxaparin Sodium (Lovenox -)  70 mg SQ BID [facility]


   Last Admin: 01/19/18 09:24 Dose:  70 mg


Sodium Chloride (Normal Saline -)  1,000 mls @ 42 mls/hr IV ASDIR [facility]


   Last Admin: 01/19/18 08:15 Dose:  Not Given


Insulin Aspart (Novolog Vial Sliding Scale -)  1 vial SQ ACHS [facility]


   PRN Reason: Protocol


   Last Admin: 01/19/18 16:31 Dose:  Not Given


Prednisone (Deltasone -)  50 mg PO ONCE ONE


   Stop: 01/19/18 20:01


Prednisone (Deltasone -)  50 mg PO ONCE ONE


   Stop: 01/20/18 02:01


Prednisone (Deltasone -)  50 mg PO ONCE ONE


   Stop: 01/20/18 08:01











- Objective


Vital Signs: 


 Vital Signs











Temperature  98.3 F   01/19/18 14:42


 


Pulse Rate  84   01/19/18 09:00


 


Respiratory Rate  19   01/19/18 09:00


 


Blood Pressure  137/77   01/19/18 09:00


 


O2 Sat by Pulse Oximetry (%)  94 L  01/19/18 09:00











Constitutional: Yes: Well Nourished, Calm


Eyes: Yes: WNL


HENT: Yes: WNL


Neck: Yes: WNL


Cardiovascular: Yes: Pulse Irregular, S1, S2


Respiratory: Yes: Diminished


Gastrointestinal: Yes: Normal Bowel Sounds, Soft


Extremities: Yes: WNL


Edema: Yes


Labs: 


 CBC, BMP





 01/19/18 05:35 





 01/19/18 05:35 





 INR, PTT











INR  1.33  (0.82-1.09)  H  01/11/18  15:30    














Assessment/Plan





Problem List





- Problems


(1) DVT (deep venous thrombosis)


Code(s): I82.409 - ACUTE EMBOLISM AND THOMBOS UNSP DEEP VN UNSP LOWER EXTREMITY

   





(2) Right atrial thrombus


Code(s): [id number] -    





(3) IVC thrombosis


Code(s): I82.220 - ACUTE EMBOLISM AND THROMBOSIS OF INFERIOR VENA CAVA   





(4) Acute kidney injury


Code(s): N17.9 - ACUTE KIDNEY FAILURE, UNSPECIFIED   





(5) Elevated troponin


Code(s): R74.8 - ABNORMAL LEVELS OF OTHER SERUM ENZYMES   





(6) Lactic acidosis


Code(s): E87.2 - ACIDOSIS   





(7) Atrial fibrillation


Code(s): I48.91 - UNSPECIFIED ATRIAL FIBRILLATION   





(8) CAD (coronary artery disease)


Code(s): I25.10 - ATHSCL HEART DISEASE OF NATIVE CORONARY ARTERY W/O ANG PCTRS 

  





(9) Systolic dysfunction, left ventricle


Code(s): I51.9 - HEART DISEASE, UNSPECIFIED   





(10) Dementia


Code(s): F03.90 - UNSPECIFIED DEMENTIA WITHOUT BEHAVIORAL DISTURBANCE   





Assessment/Plan





Extensive RLE DVT,,LLE


Large Bilateral pulmonary emboli main pulmonary arteries


RA/IVC thrombus


Likely PE


Acute Kidney Injury improved


Atrial Fibrillation


+Troponins


LV Systolic Dysfunction


Dementia


RUL Nodule/mass





-   anticoagulation


-  O2 to keep Spo2 >90%


-  IVF


-  monitor urine output, creatinine


- ct abd/pelvis





DR DOBBS

## 2018-01-19 NOTE — PN
Physical Exam: 


SUBJECTIVE: Patient seen and examined at the bedside.





OBJECTIVE:


For a CT scan of head to rule out mets


CT AP ordered for 9am tomorrow after pt is pre-medicated:


- Prednisone 50mg at 13, 7 and 1 hour prior to CT, With Benadryl 50mg PO 1 hour 

prior to CT


 Vital Signs











 Period  Temp  Pulse  Resp  BP Sys/Ramsey  Pulse Ox


 


 Last 24 Hr  97.3 F-98.3 F  82-95  16-19  136-148/61-83  94








GENERAL: The patient is awake in no acute distress, periods of forgetfulness/

confusion


HEAD: Normal with no signs of trauma.


EYES: PERRL, extraocular movements intact, sclera anicteric, conjunctiva clear. 

No ptosis. 


ENT:  moist mucous membranes.


NECK: Trachea midline, full range of motion, supple. 


LUNGS: diminished breath sounds bilaterally


HEART: irregular heart rate


ABDOMEN: Soft, nontender, nondistended


EXTREMITIES: s/p right leg hematoma evacuation, dressings daily


NEUROLOGICAL:  Normal speech, gait not observed.


PSYCH: Normal mood, normal affect.


SKIN: Warm, dry, normal turgor, no rashes or lesions noted


 Laboratory Results - last 24 hr











  01/17/18 01/18/18 01/18/18





  06:50 17:09 21:03


 


WBC   


 


RBC   


 


Hgb   


 


Hct  31.2 L  


 


MCV   


 


MCH   


 


MCHC   


 


RDW   


 


Plt Count   


 


MPV   


 


Neutrophils %   


 


Lymphocytes %   


 


Monocytes %   


 


Eosinophils %   


 


Basophils %   


 


Sodium   


 


Potassium   


 


Chloride   


 


Carbon Dioxide   


 


Anion Gap   


 


BUN   


 


Creatinine   


 


Creat Clearance w eGFR   


 


POC Glucometer   223  211


 


Random Glucose   


 


Calcium   


 


Total Bilirubin   


 


AST   


 


ALT   


 


Alkaline Phosphatase   


 


Total Protein   


 


Albumin   


 


Folate  >1987  


 


Folate Hemolysate  >620.0  














  01/19/18 01/19/18 01/19/18





  05:35 05:35 11:22


 


WBC  12.3 H  


 


RBC  3.46 L  


 


Hgb  9.3 L  


 


Hct  29.6 L  


 


MCV  85.7  


 


MCH  27.0  


 


MCHC  31.5 L  


 


RDW  15.4  


 


Plt Count  239  


 


MPV  8.5  


 


Neutrophils %  65.3  D  


 


Lymphocytes %  26.2  D  


 


Monocytes %  7.7  


 


Eosinophils %  0.4  D  


 


Basophils %  0.4  D  


 


Sodium   142 


 


Potassium   4.2 


 


Chloride   108 H 


 


Carbon Dioxide   25 


 


Anion Gap   9 


 


BUN   22 H 


 


Creatinine   1.0 


 


Creat Clearance w eGFR   52.69 


 


POC Glucometer    208


 


Random Glucose   136 H 


 


Calcium   8.2 L 


 


Total Bilirubin   0.7 


 


AST   11 L 


 


ALT   13 


 


Alkaline Phosphatase   70 


 


Total Protein   5.1 L 


 


Albumin   2.1 L 


 


Folate   


 


Folate Hemolysate   








Active Medications











Generic Name Dose Route Start Last Admin





  Trade Name Freq  PRN Reason Stop Dose Admin


 


Atenolol  50 mg  01/12/18 10:00  01/19/18 09:24





  Tenormin -  PO   50 mg





  DAILY JOSE L   Administration


 


Diphenhydramine HCl  50 mg  01/20/18 08:00  





  Benadryl -  PO  01/20/18 08:01  





  ONCE ONE   


 


Donepezil HCl  5 mg  01/12/18 22:00  01/18/18 23:13





  Aricept -  PO   5 mg





  HS JOSE L   Administration


 


Enoxaparin Sodium  70 mg  01/17/18 22:00  01/19/18 09:24





  Lovenox -  SQ   70 mg





  BID JOSE L   Administration


 


Sodium Chloride  1,000 mls @ 42 mls/hr  01/11/18 23:45  01/19/18 08:15





  Normal Saline -  IV   Not Given





  ASDIR JOSE L   


 


Insulin Aspart  1 vial  01/13/18 16:30  01/19/18 11:40





  Novolog Vial Sliding Scale -  SQ   4 units





  ACHS JOSE L   Administration





  Protocol   


 


Prednisone  50 mg  01/19/18 20:00  





  Deltasone -  PO  01/19/18 20:01  





  ONCE ONE   


 


Prednisone  50 mg  01/20/18 02:00  





  Deltasone -  PO  01/20/18 02:01  





  ONCE ONE   


 


Prednisone  50 mg  01/20/18 08:00  





  Deltasone -  PO  01/20/18 08:01  





  ONCE ONE   











ASSESSMENT/PLAN:





Patient is an 85 year old female with a significant pat medical history of HTN, 

Afib off a/c x 3 weeks following a fall and injury to her right leg causing a 

hematoma (s/p surgical evacuation), systolic heart failure, carotid artery 

disease, venous stasis ulcers,and dementia.  Patient was admitted on 1/12/2018  

with syncope and URI symptoms. Found to have bilateral DVTs and RA thrombus.





Hematology/Oncology:


Extensive RA thrombus, Pulmonary emboli 


Bilateral DVTs


CTA shows PE along with RA thrombus, bilateral DVT


May be provoked 2/2 recent surgery vs. malignancy cause


On Lovenox 70mg BID


No IVC placement or systemic thrombolysis as per cardiologist unless patient 

decompensates


Continue malignancy workup as per oncology


Chest CT with contrast tomorrow to rule out lung mets, possible nodule: 

premedicated with Prednisone as per protocol prior


Head CT to rule out brain mets





Cardiology:


A fib, controlled


Atenolol 50mg daily, Lovenox BID





Systolic heart failure


Lasix on hold for SHIVA


Monitor 





Renal:


SHIVA


Hold Lasix, gentle hydration NS @ 42cc/hr





Skin:


Right lower extremity wound


Dressing changes daily





Endocrine


Diabetes


Monitor blood sugars with BGMs





F.E.N.


Fluids: gentle hydration


Electrolytes: monitor


Nutrition: diabetic diet





Prophylaxis:


DVT: Lovenox 70mg BID


GI: Protonix





Disposition:  full code.  As per family, patient to return home with family 

with A care.  Patient family will to follow up outpatient with oncologist 

after tests are completed. 














Visit type





- Emergency Visit


Emergency Visit: Yes


ED Registration Date: 01/12/18


Care time: The patient presented to the Emergency Department on the above date 

and was hospitalized for further evaluation of their emergent condition.





- New Patient


This patient is new to me today: No





- Critical Care


Critical Care patient: No





- Discharge Referral


Referred to [facility] No

## 2018-01-19 NOTE — PN
Progress Note (short form)





- Note


Progress Note: 








Chief Complaint: 





syncope


History of Present Illness: 





On IVF. hgb stable on heparin drip. Extensive bilateral PE.  


no cp, sob, palpit, syncope








 


 


 


 Current Medications











Generic Name Dose Route Start Last Admin





  Trade Name Fabienne  PRN Reason Stop Dose Admin


 


Atenolol  50 mg  01/12/18 10:00  01/19/18 09:24





  Tenormin -  PO   50 mg





  DAILY JOSE L   Administration


 


Donepezil HCl  5 mg  01/12/18 22:00  01/18/18 23:13





  Aricept -  PO   5 mg





  HS JOSE L   Administration


 


Enoxaparin Sodium  70 mg  01/17/18 22:00  01/19/18 09:24





  Lovenox -  SQ   70 mg





  BID JOSE L   Administration


 


Sodium Chloride  1,000 mls @ 42 mls/hr  01/11/18 23:45  01/19/18 08:15





  Normal Saline -  IV   Not Given





  ASDIR JOSE L   


 


Insulin Aspart  1 vial  01/13/18 16:30  01/19/18 08:14





  Novolog Vial Sliding Scale -  SQ   Not Given





  ACHS [facility]   





  Protocol   








 


 Vital Signs











 Period  Temp  Pulse  Resp  BP Sys/Ramsey  Pulse Ox


 


 Last 24 Hr  97.3 F-98.2 F  82-95  16-19  136-148/61-83  











Constitutional: Yes: No Distress, Calm


Eyes: No: Sclera Icterus


HENT: No: Nasal Congestion


Cardiovascular: Yes: Regular Rate and Rhythm, S1, S2, Other (PMI non diplaced).

  No: Gallop, Murmur


Respiratory: Yes: CTA Bilaterally.  No: Accessory Muscle Use, Rales, Wheezes


Gastrointestinal: Yes: Normal Bowel Sounds, Soft.  No: Tenderness


Musculoskeletal: Yes: Other (No kyphosis)


Extremities: No: Cyanosis


Edema: No


Integumentary: No: Jaundice


Neurological: Yes: Alert, Oriented (x3)


Psychiatric: No: Agitated


Labs: 


 





 


 


 


 CBC, BMP





 01/19/18 05:35 





 01/19/18 05:35 








- ....Imaging


EKG: Other (tele: afib, good HR)











Echo 1/12/18: nl LV/EF. RV tds. dilated LA/RA. large complex echodensity in RA, 

protrudes into IVC c/w thrombus. mod TR. RVSP 47.





CTA:  extensive bilateral PE.  1 cm RUL mass suspicious for malignancy.  Small 

bilateral pleural effusions and basilar atelectasis R>L.  





LE duplex: bilat DVTs (extensive RLE)





Assessment/Plan


85 year-old female with a PMH significant for HTN, atrial fibrillation off a/c 

x 3 weeks, ? systolic heart failure, carotid artery disease, venous stasis 

ulcers, and dementia. + upper respiratory symptoms, including a dry cough, x 10 

days. At PMD office on DOA she became cold and clammy and she had a brief 

episode of unresponsiveness. Her blood pressure was noted to be low. Returned 

to baseline mental status. In the ED, the patient was at her baseline mental 

status. Patient was taken off Xarelto three weeks ago following a fall and 

injury to her right leg causing a hematoma which required surgical incision at 

[facility].








extensive LE DVT, RA thrombus:


-probable thrombus in RA, extending from IVC. likely from extensive RLE thrombus

, in transit in setting of both extensive bilateral DVT (? provoked, given 

recent fall with injury and surgical procedure)  and extensive bilateral PE 

with concern for RUL malignancy.  


- RV not well seen on echo but remains hemodynamically stable, oxygenating 

well.  Tolerating AC.  No strong indication for either ivc placement or 

thrombolysis at this time.  However, given intracardiac thrombus and extensive 

clot burden, she is at risk for clinical deterioration.  can consider need for 

thrombolysis if she decompensates clinically.  Would confirm no brain mets or 

recent cva since those clinical scenarios would be contraindications to 

thrombolytics.  


-on lovenox per Heme


-if she remains clinically stable, will rec repeat imaging after 2-4 weeks of AC

, expect thrombus to organize and/or break up


- malignancy w/u per  hematology consult





syncope:


-brief LOC with hypotension on DOA at PMD office


-likely sec to PE or ? right sided cardiac output obstruction from RA mass


-transient sx's, hemodynamically stable since


-mgmt plan as above


-not orthostatic here





Afib:


-HR controlled, cont atenolol


-AC as above





HTN:


-bp controlled, same meds





SHIVA:


-likely vol depleted/prerenal


-numbers improved s/p  IVF

## 2018-01-20 RX ADMIN — INSULIN ASPART SCH UNITS: 100 INJECTION, SOLUTION INTRAVENOUS; SUBCUTANEOUS at 17:22

## 2018-01-20 RX ADMIN — INSULIN ASPART SCH: 100 INJECTION, SOLUTION INTRAVENOUS; SUBCUTANEOUS at 06:05

## 2018-01-20 RX ADMIN — ATENOLOL SCH MG: 50 TABLET ORAL at 11:28

## 2018-01-20 RX ADMIN — INSULIN ASPART SCH UNITS: 100 INJECTION, SOLUTION INTRAVENOUS; SUBCUTANEOUS at 11:28

## 2018-01-20 RX ADMIN — SODIUM CHLORIDE SCH: 9 INJECTION, SOLUTION INTRAVENOUS at 02:40

## 2018-01-20 RX ADMIN — SODIUM CHLORIDE SCH MLS/HR: 9 INJECTION, SOLUTION INTRAVENOUS at 23:59

## 2018-01-20 RX ADMIN — ENOXAPARIN SODIUM SCH MG: 80 INJECTION SUBCUTANEOUS at 11:28

## 2018-01-20 NOTE — PN
Progress Note (short form)





- Note


Progress Note: 





Patient seen and examined





Denies any complaints








 Last Vital Signs











Temp Pulse Resp BP Pulse Ox


 


 97.8 F   84   18   127/82   94 L


 


 01/20/18 17:59  01/20/18 17:59  01/20/18 17:59  01/20/18 17:59  01/20/18 10:00








Cor: RSR, No murmurs, No gallops


Lungs: Clear to P&A


Abd: Soft, Normal bowel sounds, No organomegaly


Ext:No significant edema








Labs/MEds reviewed





A/P





84 y/o patient with RLE,LLE DVT


RA thrombus


extensive PE


RUL mass suspicious for malignancy


Atrial Fibrillation


+Troponins


LV Systolic Dysfunction


Dementia





Was off xeralto since 12/12 when she had a fall and was admitted to [facility] until 

day before Christmas. Has been off xeralto and developed extensive DVT/Rt. 

atrial  clot





likely from an underlying malignancy causing a hypercoagulable state.





?Lung mass suspicious for neoplastic process ---icm RUL lunmg mass--needs close 

outpatient f/'u CT chest





 CT a/p with contrast , -- complex renal cysts/pancreatic cyst --will need 

close MRI with contrast f/u outpatient or f/u CT





agree with lovenox





will need to discuss goals of care with family regarding need for further tests

  to w/u abnormal imaging findings/occult chayito aceves. in this frail 

patioejt with dementia

## 2018-01-20 NOTE — PN
Progress Note (short form)





- Note


Progress Note: 


Overall breathing improving. 


No CP.  No acute overnight. 


 Intake & Output











 01/17/18 01/18/18 01/19/18 01/20/18





 23:59 23:59 23:59 23:59


 


Intake Total 1098 1424 520 


 


Balance 1098 1424 520 








 Last Vital Signs











Temp Pulse Resp BP Pulse Ox


 


 97.3 F L  86   20   146/73   94 L


 


 01/20/18 06:00  01/20/18 06:00  01/20/18 06:00  01/20/18 06:00  01/19/18 22:00








Active Medications





Acetaminophen (Tylenol -)  650 mg PO Q6H PRN


   PRN Reason: FEVER


   Last Admin: 01/19/18 20:35 Dose:  650 mg


Atenolol (Tenormin -)  50 mg PO DAILY [facility]


   Last Admin: 01/20/18 11:28 Dose:  50 mg


Donepezil HCl (Aricept -)  5 mg PO HS [facility]


   Last Admin: 01/19/18 20:59 Dose:  5 mg


Enoxaparin Sodium (Lovenox -)  70 mg SQ BID [facility]


   Last Admin: 01/20/18 11:28 Dose:  70 mg


Sodium Chloride (Normal Saline -)  1,000 mls @ 42 mls/hr IV ASDIR [facility]


   Last Admin: 01/20/18 02:40 Dose:  Not Given


Insulin Aspart (Novolog Vial Sliding Scale -)  1 vial SQ ACHS [facility]


   PRN Reason: Protocol


   Last Admin: 01/20/18 11:28 Dose:  4 units











Constitutional: Yes: NAD 


Eyes: Yes: WNL


HENT: Yes: WNL


Neck: Yes: WNL


Cardiovascular: Yes: Pulse Irregular, S1, S2


Respiratory: Yes: Diminished at the bases 


Gastrointestinal: Yes: Normal Bowel Sounds, Soft


Extremities: Yes: WNL


Edema: Yes


Labs: 


 


 


 Laboratory Results - last 24 hr











  01/19/18 01/20/18 01/20/18





  20:32 05:38 11:24


 


POC Glucometer  278  252  230




















Assessment/Plan





Problem List





- Problems


(1) DVT (deep venous thrombosis)


Code(s): I82.409 - ACUTE EMBOLISM AND THOMBOS UNSP DEEP VN UNSP LOWER EXTREMITY

   





(2) Right atrial thrombus


Code(s): CVT2717 -    





(3) IVC thrombosis


Code(s): I82.220 - ACUTE EMBOLISM AND THROMBOSIS OF INFERIOR VENA CAVA   





(4) Acute kidney injury


Code(s): N17.9 - ACUTE KIDNEY FAILURE, UNSPECIFIED   





(5) Elevated troponin


Code(s): R74.8 - ABNORMAL LEVELS OF OTHER SERUM ENZYMES   





(6) Lactic acidosis


Code(s): E87.2 - ACIDOSIS   





(7) Atrial fibrillation


Code(s): I48.91 - UNSPECIFIED ATRIAL FIBRILLATION   





(8) CAD (coronary artery disease)


Code(s): I25.10 - ATHSCL HEART DISEASE OF NATIVE CORONARY ARTERY W/O ANG PCTRS 

  





(9) Systolic dysfunction, left ventricle


Code(s): I51.9 - HEART DISEASE, UNSPECIFIED   





(10) Dementia


Code(s): F03.90 - UNSPECIFIED DEMENTIA WITHOUT BEHAVIORAL DISTURBANCE   





Assessment/Plan


Extensive RLE DVT,,LLE


Large Bilateral pulmonary emboli main pulmonary arteries


RA/IVC thrombus


Likely PE


Acute Kidney Injury improved


Atrial Fibrillation


+Troponins


LV Systolic Dysfunction


Dementia


RUL Nodule/mass





- Lovenox 70mg SQ BID


- Will need further imaging and eventual tissue diagnosis once stable from this 

acute episode 


- O2 to maintain saturation 


- IVF





Dr Mancia

## 2018-01-20 NOTE — PN
Physical Exam: 


SUBJECTIVE: Patient seen and examined. She has no active complaints, she wants 

to go home. 








OBJECTIVE:





 Vital Signs











 Period  Temp  Pulse  Resp  BP Sys/Ramsey  Pulse Ox


 


 Last 24 Hr  97.3 F-98.1 F    20-20  122-152/70-95  94-94








PE


Neuro: alert, awake, cn 2-12intact


Pulm: basilar crackles L > R 


CV: s1 s2 irregular rhythm 


Abd: s nt nd + bs


ExT: RLE incision packed, CDI + tenderness 





 Laboratory Results - last 24 hr











  01/19/18 01/20/18 01/20/18





  20:32 05:38 11:24


 


POC Glucometer  278  252  230








Active Medications











Generic Name Dose Route Start Last Admin





  Trade Name Freq  PRN Reason Stop Dose Admin


 


Acetaminophen  650 mg  01/19/18 20:09  01/19/18 20:35





  Tylenol -  PO   650 mg





  Q6H PRN   Administration





  FEVER   


 


Atenolol  50 mg  01/12/18 10:00  01/20/18 11:28





  Tenormin -  PO   50 mg





  DAILY JOSE L   Administration


 


Donepezil HCl  5 mg  01/12/18 22:00  01/19/18 20:59





  Aricept -  PO   5 mg





  HS JOSE L   Administration


 


Enoxaparin Sodium  70 mg  01/17/18 22:00  01/20/18 11:28





  Lovenox -  SQ   70 mg





  BID JOSE L   Administration


 


Sodium Chloride  1,000 mls @ 42 mls/hr  01/11/18 23:45  01/20/18 02:40





  Normal Saline -  IV   Not Given





  ASDIR JOSE L   


 


Insulin Aspart  1 vial  01/13/18 16:30  01/20/18 11:28





  Novolog Vial Sliding Scale -  SQ   4 units





  ACHS JOSE L   Administration





  Protocol   








 Microbiology











 01/17/18 17:00 Urine Culture - Final





 Urine - Urine - Catheterized    Enterococcus Faecalis


 


 01/17/18 19:30 Blood Culture - Preliminary





 Blood - Peripheral Venous    NO GROWTH OBTAINED AFTER 48 HOURS, INCUBATION TO 

CONTINUE





    FOR 3 DAYS.


 


 01/17/18 19:30 Blood Culture - Preliminary





 Blood - Peripheral Venous    NO GROWTH OBTAINED AFTER 48 HOURS, INCUBATION TO 

CONTINUE





    FOR 3 DAYS.











Imaging: 


- ECHO 1/12/18: nl LV/EF. RV tds. dilated LA/RA. large complex echodensity in RA

, protrudes into IVC c/w thrombus. mod TR. RVSP 47.





Assessment: 85 year-old female with a PMH significant for HTN, Afib off a/c x 3 

weeks following a fall and injury to her right leg causing a hematoma which 

required surgical incision at [facility],systolic heart failure, carotid artery 

disease, venous stasis ulcers,and dementia, admitted with syncope and URI 

symptoms. Found to have bilateral DVTs and RA thrombus and PE. 





Plan: 


1. Extensive RA thrombus, Pulmonary emboli 


- CTA shows PE along with RA thrombus


- Lovenox 70mg BID


- CTAP, CT head negative for malignancy 


- No IVC placement/thrombolysis at this time unless decompensating, will need 

eval for exclusion factors first 





2. Bilateral LE DVT


- Lovenox BID 





3. Syncope


- Quiescent 


- Possible PE induced vs RA mass 





4. Leukocytosis


- WBC downtrending 


- Urine cx + enterococcus, pt is asymptomatic, no infectious signs


- Will hold on abx at this time  





5. A fib, controlled 


- Atenolol 50mg daily 





6. Systolic heart failure


- Not in exacerbation 


- Hold lasix 





7. SHIVA


- Improved


- Continue gentle IVF 


- Hold lasix/ACE





8. Right lower extremity wound


- Continue xeroform and sterile gauze


- Surgery follow up as outpt 





9. Dementia


- Continue Donepezil





10. PPX


- Lovenox 


- PT 





11. DM II 


- ISS, BGM ACHS 








Visit type





- Emergency Visit


Emergency Visit: Yes


ED Registration Date: 01/12/18


Care time: The patient presented to the Emergency Department on the above date 

and was hospitalized for further evaluation of their emergent condition.





- New Patient


This patient is new to me today: No





- Critical Care


Critical Care patient: No

## 2018-01-20 NOTE — PN
Progress Note, Physician


History of Present Illness: 





No events overnight as per RN


Tele: Afib in 80s


No complaints this AM





- Current Medication List


Current Medications: 


Active Medications





Acetaminophen (Tylenol -)  650 mg PO Q6H PRN


   PRN Reason: FEVER


   Last Admin: 01/19/18 20:35 Dose:  650 mg


Atenolol (Tenormin -)  50 mg PO DAILY [facility]


   Last Admin: 01/19/18 09:24 Dose:  50 mg


Donepezil HCl (Aricept -)  5 mg PO HS [facility]


   Last Admin: 01/19/18 20:59 Dose:  5 mg


Enoxaparin Sodium (Lovenox -)  70 mg SQ BID [facility]


   Last Admin: 01/19/18 20:59 Dose:  70 mg


Sodium Chloride (Normal Saline -)  1,000 mls @ 42 mls/hr IV ASDIR [facility]


   Last Admin: 01/20/18 02:40 Dose:  Not Given


Insulin Aspart (Novolog Vial Sliding Scale -)  1 vial SQ ACHS [facility]


   PRN Reason: Protocol


   Last Admin: 01/20/18 06:05 Dose:  Not Given











- Objective


Vital Signs: 


 Vital Signs











Temperature  97.3 F L  01/20/18 06:00


 


Pulse Rate  86   01/20/18 06:00


 


Respiratory Rate  20   01/20/18 06:00


 


Blood Pressure  146/73   01/20/18 06:00


 


O2 Sat by Pulse Oximetry (%)  94 L  01/19/18 22:00











Constitutional: Yes: No Distress, Calm


Eyes: Yes: WNL


HENT: Yes: WNL


Neck: Yes: WNL


Cardiovascular: Yes: Regular Rate and Rhythm


Respiratory: Yes: CTA Bilaterally


Gastrointestinal: Yes: Normal Bowel Sounds


Extremities: Yes: WNL


Edema: Yes


Edema: RUE: 1+, LLE: 1+


Labs: 


 CBC, BMP





 01/19/18 05:35 





 01/19/18 05:35 





 INR, PTT











INR  1.33  (0.82-1.09)  H  01/11/18  15:30    














Assessment/Plan





Echo 1/12/18: nl LV/EF. RV tds. dilated LA/RA. large complex echodensity in RA, 

protrudes into IVC c/w thrombus. mod TR. RVSP 47.





CTA:  extensive bilateral PE.  1 cm RUL mass suspicious for malignancy.  Small 

bilateral pleural effusions and basilar atelectasis R>L.  





LE duplex: bilat DVTs (extensive RLE)





Assessment/Plan


85 year-old female with a PMH significant for HTN, atrial fibrillation off a/c 

x 3 weeks, ? systolic heart failure, carotid artery disease, venous stasis 

ulcers, and dementia. + upper respiratory symptoms, including a dry cough, x 10 

days. At PMD office on DOA she became cold and clammy and she had a brief 

episode of unresponsiveness. Her blood pressure was noted to be low. Returned 

to baseline mental status. In the ED, the patient was at her baseline mental 

status. Patient was taken off Xarelto three weeks ago following a fall and 

injury to her right leg causing a hematoma which required surgical incision at 

[facility].








extensive LE DVT, RA thrombus:


-probable thrombus in RA, extending from IVC. likely from extensive RLE thrombus

, in transit in setting of both extensive bilateral DVT (? provoked, given 

recent fall with injury and surgical procedure)  and extensive bilateral PE 

with concern for RUL malignancy.  


- RV not well seen on echo but remains hemodynamically stable, oxygenating 

well.  Tolerating AC.  No strong indication for either ivc placement or 

thrombolysis at this time.  However, given intracardiac thrombus and extensive 

clot burden, she is at risk for clinical deterioration.  can consider need for 

thrombolysis if she decompensates clinically.  


-on lovenox per Heme


-if she remains clinically stable, will rec repeat imaging after 2-4 weeks of AC

, expect thrombus to organize and/or break up


- malignancy w/u per  hematology consult





syncope:


-brief LOC with hypotension on DOA at PMD office


-likely sec to PE or ? right sided cardiac output obstruction from RA mass


-transient sx's, hemodynamically stable since


-mgmt plan as above


-not orthostatic here





Afib:


-HR controlled, cont atenolol


-AC as above





HTN:


-bp controlled, same meds





SHIVA:


-likely vol depleted/prerenal


-numbers improved s/p  IVF

## 2018-01-21 VITALS — TEMPERATURE: 97.8 F | SYSTOLIC BLOOD PRESSURE: 129 MMHG | HEART RATE: 84 BPM | DIASTOLIC BLOOD PRESSURE: 76 MMHG

## 2018-01-21 LAB
ANION GAP SERPL CALC-SCNC: 9 MMOL/L (ref 8–16)
BASOPHILS # BLD: 0.1 % (ref 0–2)
BUN SERPL-MCNC: 20 MG/DL (ref 7–18)
CALCIUM SERPL-MCNC: 8 MG/DL (ref 8.5–10.1)
CHLORIDE SERPL-SCNC: 105 MMOL/L (ref 98–107)
CO2 SERPL-SCNC: 26 MMOL/L (ref 21–32)
CREAT SERPL-MCNC: 0.9 MG/DL (ref 0.55–1.02)
DEPRECATED RDW RBC AUTO: 15.1 % (ref 11.6–15.6)
EOSINOPHIL # BLD: 0.1 % (ref 0–4.5)
GLUCOSE SERPL-MCNC: 117 MG/DL (ref 74–106)
HCT VFR BLD CALC: 30.1 % (ref 32.4–45.2)
HGB BLD-MCNC: 9.4 GM/DL (ref 10.7–15.3)
LYMPHOCYTES # BLD: 13.8 % (ref 8–40)
MCH RBC QN AUTO: 26.6 PG (ref 25.7–33.7)
MCHC RBC AUTO-ENTMCNC: 31.2 G/DL (ref 32–36)
MCV RBC: 85.1 FL (ref 80–96)
MONOCYTES # BLD AUTO: 5.6 % (ref 3.8–10.2)
NEUTROPHILS # BLD: 80.4 % (ref 42.8–82.8)
PLATELET # BLD AUTO: 255 K/MM3 (ref 134–434)
PMV BLD: 8.3 FL (ref 7.5–11.1)
POTASSIUM SERPLBLD-SCNC: 4.4 MMOL/L (ref 3.5–5.1)
RBC # BLD AUTO: 3.54 M/MM3 (ref 3.6–5.2)
SODIUM SERPL-SCNC: 140 MMOL/L (ref 136–145)
WBC # BLD AUTO: 19.5 K/MM3 (ref 4–10)

## 2018-01-21 RX ADMIN — ENOXAPARIN SODIUM SCH MG: 80 INJECTION SUBCUTANEOUS at 00:16

## 2018-01-21 RX ADMIN — ATENOLOL SCH MG: 50 TABLET ORAL at 10:30

## 2018-01-21 RX ADMIN — ENOXAPARIN SODIUM SCH MG: 80 INJECTION SUBCUTANEOUS at 10:29

## 2018-01-21 RX ADMIN — INSULIN ASPART SCH UNITS: 100 INJECTION, SOLUTION INTRAVENOUS; SUBCUTANEOUS at 00:16

## 2018-01-21 RX ADMIN — INSULIN ASPART SCH: 100 INJECTION, SOLUTION INTRAVENOUS; SUBCUTANEOUS at 07:07

## 2018-01-21 RX ADMIN — DONEPEZIL HYDROCHLORIDE SCH MG: 5 TABLET, FILM COATED ORAL at 00:16

## 2018-01-21 RX ADMIN — INSULIN ASPART SCH: 100 INJECTION, SOLUTION INTRAVENOUS; SUBCUTANEOUS at 11:55

## 2018-01-21 NOTE — PN
Progress Note, Physician


History of Present Illness: 





No events overnight


Tele: Afib 70s


No dyspnea





- Current Medication List


Current Medications: 


Active Medications





Acetaminophen (Tylenol -)  650 mg PO Q6H PRN


   PRN Reason: FEVER


   Last Admin: 01/19/18 20:35 Dose:  650 mg


Atenolol (Tenormin -)  50 mg PO DAILY [facility]


   Last Admin: 01/21/18 10:30 Dose:  50 mg


Donepezil HCl (Aricept -)  5 mg PO HS [facility]


   Last Admin: 01/21/18 00:16 Dose:  5 mg


Enoxaparin Sodium (Lovenox -)  70 mg SQ BID [facility]


   Last Admin: 01/21/18 10:29 Dose:  70 mg


Sodium Chloride (Normal Saline -)  1,000 mls @ 42 mls/hr IV ASDIR [facility]


   Last Admin: 01/20/18 23:59 Dose:  42 mls/hr


Insulin Aspart (Novolog Vial Sliding Scale -)  1 vial SQ ACHS [facility]


   PRN Reason: Protocol


   Last Admin: 01/21/18 07:07 Dose:  Not Given











- Objective


Vital Signs: 


 Vital Signs











Temperature  97.6 F   01/21/18 06:00


 


Pulse Rate  80   01/21/18 06:00


 


Respiratory Rate  18   01/21/18 06:00


 


Blood Pressure  136/85   01/21/18 06:00


 


O2 Sat by Pulse Oximetry (%)  94 L  01/20/18 10:00











Constitutional: Yes: No Distress, Calm


Eyes: Yes: WNL


HENT: Yes: WNL


Neck: Yes: WNL


Cardiovascular: Yes: Pulse Irregular


Respiratory: Yes: CTA Bilaterally


Gastrointestinal: Yes: WNL


Extremities: Yes: WNL


Edema: Yes


Edema: LLE: Trace, RLE: Trace


Labs: 


 CBC, BMP





 01/21/18 05:45 





 01/21/18 05:45 





 INR, PTT











INR  1.33  (0.82-1.09)  H  01/11/18  15:30    














Assessment/Plan





Echo 1/12/18: nl LV/EF. RV tds. dilated LA/RA. large complex echodensity in RA, 

protrudes into IVC c/w thrombus. mod TR. RVSP 47.





CTA:  extensive bilateral PE.  1 cm RUL mass suspicious for malignancy.  Small 

bilateral pleural effusions and basilar atelectasis R>L.  





LE duplex: bilat DVTs (extensive RLE)





Assessment/Plan


85 year-old female with a PMH significant for HTN, atrial fibrillation off a/c 

x 3 weeks, ? systolic heart failure, carotid artery disease, venous stasis 

ulcers, and dementia. + upper respiratory symptoms, including a dry cough, x 10 

days. At PMD office on DOA she became cold and clammy and she had a brief 

episode of unresponsiveness. Her blood pressure was noted to be low. Returned 

to baseline mental status. In the ED, the patient was at her baseline mental 

status. Patient was taken off Xarelto three weeks ago following a fall and 

injury to her right leg causing a hematoma which required surgical incision at 

[facility].








extensive LE DVT, RA thrombus:


-probable thrombus in RA, extending from IVC. likely from extensive RLE thrombus

, in transit in setting of both extensive bilateral DVT (? provoked, given 

recent fall with injury and surgical procedure)  and extensive bilateral PE 

with concern for RUL malignancy.  


- RV not well seen on echo but remains hemodynamically stable, oxygenating 

well.  Tolerating AC.  No strong indication for either ivc placement or 

thrombolysis at this time.  However, given intracardiac thrombus and extensive 

clot burden, she is at risk for clinical deterioration.  can consider need for 

thrombolysis if she decompensates clinically.  


-on lovenox per Heme


-if she remains clinically stable, will rec repeat imaging after 2-4 weeks of AC

, expect thrombus to organize and/or break up


- malignancy w/u per  hematology consult


-Stable





syncope:


-brief LOC with hypotension on DOA at PMD office


-likely sec to PE or ? right sided cardiac output obstruction from RA mass


-transient sx's, hemodynamically stable since


-mgmt plan as above


-No tele events





Afib:


-HR controlled, cont atenolol


-AC as above - enoxaparin.  





HTN:


-bp controlled, same meds

## 2018-01-21 NOTE — DS
Physical Exam: 


SUBJECTIVE: Patient seen and examined. No distress, no acute changes. Family 

wants to bring pt home, will make arrangements for Lovenox sq injections 








OBJECTIVE:





 Vital Signs











 Period  Temp  Pulse  Resp  BP Sys/Ramsey  Pulse Ox


 


 Last 24 Hr  97.4 F-98.1 F  78-84  18-20  122-143/70-85  94








PE


Neuro: alert, awake, cn 2-12intact


Pulm: basilar crackles L > R 


CV: s1 s2 irregular rhythm 


Abd: s nt nd + bs


ExT: RLE incision packed, CDI 


 Laboratory Results - last 24 hr











  01/20/18 01/20/18 01/21/18





  11:24 22:24 05:45


 


WBC    19.5 H D


 


RBC    3.54 L


 


Hgb    9.4 L


 


Hct    30.1 L


 


MCV    85.1


 


MCH    26.6


 


MCHC    31.2 L


 


RDW    15.1


 


Plt Count    255


 


MPV    8.3


 


Neutrophils %    80.4  D


 


Lymphocytes %    13.8  D


 


Monocytes %    5.6


 


Eosinophils %    0.1


 


Basophils %    0.1


 


Sodium   


 


Potassium   


 


Chloride   


 


Carbon Dioxide   


 


Anion Gap   


 


BUN   


 


Creatinine   


 


POC Glucometer  230  178 


 


Random Glucose   


 


Calcium   














  01/21/18





  05:45


 


WBC 


 


RBC 


 


Hgb 


 


Hct 


 


MCV 


 


MCH 


 


MCHC 


 


RDW 


 


Plt Count 


 


MPV 


 


Neutrophils % 


 


Lymphocytes % 


 


Monocytes % 


 


Eosinophils % 


 


Basophils % 


 


Sodium  140


 


Potassium  4.4


 


Chloride  105


 


Carbon Dioxide  26


 


Anion Gap  9


 


BUN  20 H


 


Creatinine  0.9


 


POC Glucometer 


 


Random Glucose  117 H


 


Calcium  8.0 L











HOSPITAL COURSE:





Date of Admission:01/12/18





Date of Discharge: 01/21/18














Minutes to complete discharge: 37





Discharge Summary


Reason For Visit: PRE SYNCOPE


Current Active Problems





Acute kidney injury (Acute)


Atrial fibrillation (Acute)


CAD (coronary artery disease) (Acute)


DVT (deep venous thrombosis) (Acute)


Dementia (Acute)


Elevated troponin (Acute)


IVC thrombosis (Acute)


Lactic acidosis (Acute)


Right atrial thrombus (Acute)


Syncope, near (Acute)


Systolic dysfunction, left ventricle (Acute)








Hospital Course: 





Initial Hospital Course: 


Briefly, this 85 year-old female with PMH significant for HTN, atrial 

fibrillation off a/c x 3 weeks, systolic heart failure, carotid artery disease, 

venous stasis ulcers, and dementia. Per the patient's aide, patient has had 

upper respiratory symptoms, including a dry cough, x 10 days. Patient had an 

appointment to see her PCP, when she arrived she became cold and clammy and she 

had a brief episode of unresponsiveness and hypotensive. In the ED, the patient 

was at her baseline mental status. Patient was taken off Xarelto three weeks 

ago following a fall and injury to her right leg causing a hematoma which 

required surgical incision at [facility]. 





Imaging: 


- ECHO 1/12/18: nl LV/EF. RV tds. dilated LA/RA. large complex echodensity in RA

, protrudes into IVC c/w thrombus. mod TR. RVSP 47.





Subsequent Hospital Course/Progress Note/DC summary: 





Assessment: 85 year-old female with a PMH significant for HTN, Afib off a/c x 3 

weeks following a fall and injury to her right leg causing a hematoma which 

required surgical incision at [facility],systolic heart failure, carotid artery 

disease, venous stasis ulcers,and dementia, admitted with syncope and URI 

symptoms. Found to have bilateral DVTs and RA thrombus and PE. 





Plan: 


1. Extensive RA thrombus, Pulmonary emboli 


- CTA shows PE along with RA thrombus


- Lovenox 70mg BID


- CTAP, CT head negative for malignancy 


- Plan: no IVC placement/thrombolysis at this time unless pt is decompensating, 

will need eval for exclusion factors first 


- Close follow up with Pulmonary and hematology for imaging to eval pulmonary 

nodule and renal cysts, referral info enclosed in dc paper work 





2. Bilateral LE DVT


- Lovenox BID 





3. Syncope


- Quiescent 


- Possible PE induced vs RA mass 





4. UTI/Leukocytosis


- Pt with extensive PE/DVT/RA thrombus, could be contributing to elevated wbc


- Urine c/s + enterococcus, although asymptomatic  


- Will treat with levaquin 500mg x7 days 





5. A fib, controlled 


- Atenolol 50mg daily 





6. Systolic heart failure


- Not in exacerbation 


- Lasix stopped





7. SHIVA


- Resolved 


- Off ace/lasix 





8. Right lower extremity wound


- Continue xeroform and sterile gauze


- Surgery follow up as outpt 





9. Dementia


- Continue Donepezil





Dispo: 


- Home with 24 hr care, follow up visits as outline above 


- Pt sister senthil brittney richie 


Condition: Stable





- Instructions


Diet, Activity, Other Instructions: 


Please return to the ED for any new, persistent, or worsening symptoms. Follow 

up with your PCP in 1 week 


Take home medications as directed on home medication list


Injections with Lovenox 70mg twice daily 


Close follow up with Pulmonary and Heme.onc regarding follow up imaging of 

pulmonary nodules and renal cysts (referral enclosed)


Cardiology follow up in 1-2 weeks 


Complete antibiotics as directed and until completed 


Referrals: 


Mina Chamorro MD [Staff Physician] - 


Stefanie Coppola MD [Staff Physician] - 


Disposition: VNS/HOME HEALTH CARE





- Home Medications


Comprehensive Discharge Medication List: 


Ambulatory Orders





Cholecalciferol (Vitamin D3) [Vitamin D3] 2,000 unit PO DAILY  tablet 09/05/17 


Donepezil HCl [Aricept] 5 mg PO HS 01/11/18 


Potassium Chloride [K-Dur -] 20 meq PO BID 01/11/18 


Atenolol [Tenormin -] 50 mg PO DAILY #30 tablet 01/21/18 


Enoxaparin [Lovenox -] 70 mg SQ BID #60 disp.syrin 01/21/18 








This patient is new to me today: No


Emergency Visit: Yes


ED Registration Date: 01/12/18


Care time: The patient presented to the Emergency Department on the above date 

and was hospitalized for further evaluation of their emergent condition.


Critical Care patient: No





- Discharge Referral


Referred to [facility] No

## 2018-01-21 NOTE — PN
Progress Note (short form)





- Note


Progress Note: 


Overall breathing improving. 


No CP.  No acute overnight. 


 


 Intake & Output











 01/18/18 01/19/18 01/20/18 01/21/18





 23:59 23:59 23:59 23:59


 


Intake Total 1424 520 210 250


 


Balance 1424 520 210 250








 Last Vital Signs











Temp Pulse Resp BP Pulse Ox


 


 97.8 F   84   18   129/76   95 


 


 01/21/18 10:00  01/21/18 10:00  01/21/18 10:00  01/21/18 10:00  01/21/18 10:00








Active Medications





Acetaminophen (Tylenol -)  650 mg PO Q6H PRN


   PRN Reason: FEVER


   Last Admin: 01/19/18 20:35 Dose:  650 mg


Atenolol (Tenormin -)  50 mg PO DAILY [facility]


   Last Admin: 01/21/18 10:30 Dose:  50 mg


Donepezil HCl (Aricept -)  5 mg PO HS [facility]


   Last Admin: 01/21/18 00:16 Dose:  5 mg


Enoxaparin Sodium (Lovenox -)  70 mg SQ BID [facility]


   Last Admin: 01/21/18 10:29 Dose:  70 mg


Sodium Chloride (Normal Saline -)  1,000 mls @ 42 mls/hr IV ASDIR [facility]


   Last Admin: 01/20/18 23:59 Dose:  42 mls/hr


Insulin Aspart (Novolog Vial Sliding Scale -)  1 vial SQ ACHS [facility]


   PRN Reason: Protocol


   Last Admin: 01/21/18 11:55 Dose:  Not Given














Constitutional: Yes: NAD 


Eyes: Yes: WNL


HENT: Yes: WNL


Neck: Yes: WNL


Cardiovascular: Yes: Pulse Irregular, S1, S2


Respiratory: Yes: Diminished at the bases 


Gastrointestinal: Yes: Normal Bowel Sounds, Soft


Extremities: Yes: WNL


Edema: Yes


Labs: 


 


 


 


 Laboratory Results - last 24 hr











  01/20/18 01/21/18 01/21/18





  22:24 05:45 05:45


 


WBC   19.5 H D 


 


RBC   3.54 L 


 


Hgb   9.4 L 


 


Hct   30.1 L 


 


MCV   85.1 


 


MCH   26.6 


 


MCHC   31.2 L 


 


RDW   15.1 


 


Plt Count   255 


 


MPV   8.3 


 


Neutrophils %   80.4  D 


 


Lymphocytes %   13.8  D 


 


Monocytes %   5.6 


 


Eosinophils %   0.1 


 


Basophils %   0.1 


 


Sodium    140


 


Potassium    4.4


 


Chloride    105


 


Carbon Dioxide    26


 


Anion Gap    9


 


BUN    20 H


 


Creatinine    0.9


 


POC Glucometer  178  


 


Random Glucose    117 H


 


Calcium    8.0 L

















Assessment/Plan





Problem List





- Problems


(1) DVT (deep venous thrombosis)


Code(s): I82.409 - ACUTE EMBOLISM AND THOMBOS UNSP DEEP VN UNSP LOWER EXTREMITY

   





(2) Right atrial thrombus


Code(s): DYH2342 -    





(3) IVC thrombosis


Code(s): I82.220 - ACUTE EMBOLISM AND THROMBOSIS OF INFERIOR VENA CAVA   





(4) Acute kidney injury


Code(s): N17.9 - ACUTE KIDNEY FAILURE, UNSPECIFIED   





(5) Elevated troponin


Code(s): R74.8 - ABNORMAL LEVELS OF OTHER SERUM ENZYMES   





(6) Lactic acidosis


Code(s): E87.2 - ACIDOSIS   





(7) Atrial fibrillation


Code(s): I48.91 - UNSPECIFIED ATRIAL FIBRILLATION   





(8) CAD (coronary artery disease)


Code(s): I25.10 - ATHSCL HEART DISEASE OF NATIVE CORONARY ARTERY W/O ANG PCTRS 

  





(9) Systolic dysfunction, left ventricle


Code(s): I51.9 - HEART DISEASE, UNSPECIFIED   





(10) Dementia


Code(s): F03.90 - UNSPECIFIED DEMENTIA WITHOUT BEHAVIORAL DISTURBANCE   





Assessment/Plan


Extensive RLE DVT,,LLE


Large Bilateral pulmonary emboli main pulmonary arteries


RA/IVC thrombus


Likely PE


Acute Kidney Injury improved


Atrial Fibrillation


+Troponins


LV Systolic Dysfunction


Dementia


RUL Nodule/mass





- Lovenox 70mg SQ BID


- Will need further imaging and eventual tissue diagnosis once stable from this 

acute episode 


- D/C planning in progress 


 


Dr Mancia

## 2019-12-19 NOTE — HOSP
Subjective





- Review of Symptoms


Subjective: 





I got a phone call at 2:58 am from radiology , No acute process on CT HEAD





Physical Examination


Vital Signs: 


 Vital Signs











Temperature  97.5 F L  01/11/18 20:18


 


Pulse Rate  86   01/11/18 20:18


 


Respiratory Rate  18   01/11/18 20:52


 


Blood Pressure  111/61   01/11/18 20:18


 


O2 Sat by Pulse Oximetry (%)  92 L  01/11/18 20:52











Labs: 


 CBC, BMP





 01/12/18 01:30 





 01/12/18 01:30 Cardiomegaly    CHF (congestive heart failure)